# Patient Record
Sex: MALE | Race: WHITE | Employment: OTHER | URBAN - METROPOLITAN AREA
[De-identification: names, ages, dates, MRNs, and addresses within clinical notes are randomized per-mention and may not be internally consistent; named-entity substitution may affect disease eponyms.]

---

## 2017-08-02 ENCOUNTER — HOSPITAL ENCOUNTER (OUTPATIENT)
Dept: GENERAL RADIOLOGY | Age: 77
Discharge: HOME | End: 2017-08-02
Payer: MEDICARE

## 2017-08-02 ENCOUNTER — HOSPITAL ENCOUNTER (OUTPATIENT)
Age: 77
Discharge: HOME | End: 2017-08-02
Payer: MEDICARE

## 2017-08-02 DIAGNOSIS — M25.562 LEFT KNEE PAIN, UNSPECIFIED CHRONICITY: ICD-10-CM

## 2017-08-02 PROCEDURE — 73562 X-RAY EXAM OF KNEE 3: CPT

## 2018-03-05 ENCOUNTER — APPOINTMENT (OUTPATIENT)
Dept: CT IMAGING | Age: 78
End: 2018-03-05
Payer: MEDICARE

## 2018-03-05 ENCOUNTER — HOSPITAL ENCOUNTER (EMERGENCY)
Age: 78
Discharge: HOME | End: 2018-03-05
Attending: EMERGENCY MEDICINE
Payer: MEDICARE

## 2018-03-05 VITALS
OXYGEN SATURATION: 96 % | SYSTOLIC BLOOD PRESSURE: 166 MMHG | HEIGHT: 75 IN | TEMPERATURE: 98.6 F | BODY MASS INDEX: 29.84 KG/M2 | RESPIRATION RATE: 16 BRPM | HEART RATE: 70 BPM | WEIGHT: 240 LBS | DIASTOLIC BLOOD PRESSURE: 75 MMHG

## 2018-03-05 DIAGNOSIS — S09.90XA INJURY OF HEAD, INITIAL ENCOUNTER: ICD-10-CM

## 2018-03-05 DIAGNOSIS — S01.01XA LACERATION OF SCALP, INITIAL ENCOUNTER: Primary | ICD-10-CM

## 2018-03-05 PROCEDURE — 6370000000 HC RX 637 (ALT 250 FOR IP): Performed by: EMERGENCY MEDICINE

## 2018-03-05 PROCEDURE — 99283 EMERGENCY DEPT VISIT LOW MDM: CPT

## 2018-03-05 PROCEDURE — 70450 CT HEAD/BRAIN W/O DYE: CPT

## 2018-03-05 PROCEDURE — 12001 RPR S/N/AX/GEN/TRNK 2.5CM/<: CPT

## 2018-03-05 RX ORDER — DOXYCYCLINE HYCLATE 100 MG/1
100 CAPSULE ORAL 2 TIMES DAILY
COMMUNITY

## 2018-03-05 RX ORDER — CELECOXIB 200 MG/1
200 CAPSULE ORAL 2 TIMES DAILY
COMMUNITY

## 2018-03-05 RX ORDER — TRAMADOL HYDROCHLORIDE 50 MG/1
50 TABLET ORAL EVERY 8 HOURS PRN
Qty: 20 TABLET | Refills: 0 | Status: SHIPPED | OUTPATIENT
Start: 2018-03-05 | End: 2018-03-15

## 2018-03-05 RX ORDER — GINSENG 100 MG
CAPSULE ORAL 3 TIMES DAILY
Status: DISCONTINUED | OUTPATIENT
Start: 2018-03-05 | End: 2018-03-05 | Stop reason: HOSPADM

## 2018-03-05 RX ORDER — DULOXETIN HYDROCHLORIDE 30 MG/1
30 CAPSULE, DELAYED RELEASE ORAL 2 TIMES DAILY
COMMUNITY

## 2018-03-05 RX ORDER — CHOLECALCIFEROL (VITAMIN D3) 125 MCG
500 CAPSULE ORAL DAILY
COMMUNITY

## 2018-03-05 RX ORDER — ASPIRIN 81 MG/1
81 TABLET ORAL DAILY
COMMUNITY

## 2018-03-05 RX ORDER — ESOMEPRAZOLE MAGNESIUM 20 MG/1
20 FOR SUSPENSION ORAL DAILY
COMMUNITY

## 2018-03-05 RX ADMIN — BACITRACIN: 500 OINTMENT TOPICAL at 18:36

## 2018-03-05 ASSESSMENT — ENCOUNTER SYMPTOMS
EYE REDNESS: 0
BACK PAIN: 0
SORE THROAT: 0
FACIAL SWELLING: 0
TROUBLE SWALLOWING: 0
CHOKING: 0
CONSTIPATION: 0
VOMITING: 0
DIARRHEA: 0
SINUS PRESSURE: 0
CHEST TIGHTNESS: 0
VOICE CHANGE: 0
WHEEZING: 0
EYE DISCHARGE: 0
BLOOD IN STOOL: 0
EYE PAIN: 0
STRIDOR: 0
SHORTNESS OF BREATH: 0
ABDOMINAL PAIN: 0
COUGH: 0

## 2018-03-05 ASSESSMENT — PAIN DESCRIPTION - PAIN TYPE: TYPE: ACUTE PAIN

## 2018-03-05 ASSESSMENT — PAIN DESCRIPTION - PROGRESSION: CLINICAL_PROGRESSION: GRADUALLY WORSENING

## 2018-03-05 ASSESSMENT — PAIN DESCRIPTION - DESCRIPTORS: DESCRIPTORS: THROBBING

## 2018-03-05 ASSESSMENT — PAIN DESCRIPTION - FREQUENCY: FREQUENCY: CONTINUOUS

## 2018-03-05 ASSESSMENT — PAIN DESCRIPTION - ORIENTATION: ORIENTATION: UPPER

## 2018-03-05 ASSESSMENT — PAIN DESCRIPTION - ONSET: ONSET: SUDDEN

## 2018-03-05 ASSESSMENT — PAIN DESCRIPTION - LOCATION: LOCATION: HEAD
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
## 2019-03-12 ENCOUNTER — ANESTHESIA (OUTPATIENT)
Dept: OPERATING ROOM | Age: 79
End: 2019-03-12
Payer: MEDICARE

## 2019-03-12 ENCOUNTER — ANESTHESIA EVENT (OUTPATIENT)
Dept: OPERATING ROOM | Age: 79
End: 2019-03-12
Payer: MEDICARE

## 2019-03-12 ENCOUNTER — HOSPITAL ENCOUNTER (OUTPATIENT)
Age: 79
Setting detail: OUTPATIENT SURGERY
Discharge: HOME | End: 2019-03-12
Attending: ORTHOPAEDIC SURGERY | Admitting: ORTHOPAEDIC SURGERY
Payer: MEDICARE

## 2019-03-12 VITALS
TEMPERATURE: 98.3 F | HEIGHT: 76 IN | RESPIRATION RATE: 18 BRPM | WEIGHT: 250 LBS | BODY MASS INDEX: 30.44 KG/M2 | OXYGEN SATURATION: 99 % | DIASTOLIC BLOOD PRESSURE: 72 MMHG | SYSTOLIC BLOOD PRESSURE: 134 MMHG | HEART RATE: 98 BPM

## 2019-03-12 VITALS — OXYGEN SATURATION: 99 % | TEMPERATURE: 98.2 F | DIASTOLIC BLOOD PRESSURE: 81 MMHG | SYSTOLIC BLOOD PRESSURE: 148 MMHG

## 2019-03-12 DIAGNOSIS — Z96.652 TOTAL KNEE REPLACEMENT STATUS, LEFT: Primary | ICD-10-CM

## 2019-03-12 LAB
ANION GAP SERPL CALCULATED.3IONS-SCNC: 12 MEQ/L (ref 9–15)
BASOPHILS ABSOLUTE: 0.1 K/UL (ref 0–0.2)
BASOPHILS RELATIVE PERCENT: 1.5 %
BUN BLDV-MCNC: 23 MG/DL (ref 8–23)
CALCIUM SERPL-MCNC: 9.3 MG/DL (ref 8.5–9.9)
CHLORIDE BLD-SCNC: 104 MEQ/L (ref 95–107)
CO2: 19 MEQ/L (ref 20–31)
CREAT SERPL-MCNC: 0.65 MG/DL (ref 0.7–1.2)
EOSINOPHILS ABSOLUTE: 0.2 K/UL (ref 0–0.7)
EOSINOPHILS RELATIVE PERCENT: 2.2 %
GFR AFRICAN AMERICAN: >60
GFR NON-AFRICAN AMERICAN: >60
GLUCOSE BLD-MCNC: 101 MG/DL (ref 70–99)
HCT VFR BLD CALC: 35 % (ref 42–52)
HEMOGLOBIN: 11.1 G/DL (ref 14–18)
LYMPHOCYTES ABSOLUTE: 1.2 K/UL (ref 1–4.8)
LYMPHOCYTES RELATIVE PERCENT: 13.9 %
MCH RBC QN AUTO: 27.9 PG (ref 27–31.3)
MCHC RBC AUTO-ENTMCNC: 31.8 % (ref 33–37)
MCV RBC AUTO: 87.6 FL (ref 80–100)
MONOCYTES ABSOLUTE: 0.5 K/UL (ref 0.2–0.8)
MONOCYTES RELATIVE PERCENT: 6.2 %
NEUTROPHILS ABSOLUTE: 6.4 K/UL (ref 1.4–6.5)
NEUTROPHILS RELATIVE PERCENT: 76.2 %
PDW BLD-RTO: 19.2 % (ref 11.5–14.5)
PLATELET # BLD: 417 K/UL (ref 130–400)
POTASSIUM SERPL-SCNC: 5.8 MEQ/L (ref 3.4–4.9)
RBC # BLD: 3.99 M/UL (ref 4.7–6.1)
SODIUM BLD-SCNC: 135 MEQ/L (ref 135–144)
WBC # BLD: 8.4 K/UL (ref 4.8–10.8)

## 2019-03-12 PROCEDURE — 6360000002 HC RX W HCPCS: Performed by: ORTHOPAEDIC SURGERY

## 2019-03-12 PROCEDURE — 6370000000 HC RX 637 (ALT 250 FOR IP): Performed by: ORTHOPAEDIC SURGERY

## 2019-03-12 PROCEDURE — 7100000001 HC PACU RECOVERY - ADDTL 15 MIN: Performed by: ORTHOPAEDIC SURGERY

## 2019-03-12 PROCEDURE — 80048 BASIC METABOLIC PNL TOTAL CA: CPT

## 2019-03-12 PROCEDURE — 2500000003 HC RX 250 WO HCPCS: Performed by: NURSE ANESTHETIST, CERTIFIED REGISTERED

## 2019-03-12 PROCEDURE — 3600000003 HC SURGERY LEVEL 3 BASE: Performed by: ORTHOPAEDIC SURGERY

## 2019-03-12 PROCEDURE — 87075 CULTR BACTERIA EXCEPT BLOOD: CPT

## 2019-03-12 PROCEDURE — 7100000011 HC PHASE II RECOVERY - ADDTL 15 MIN: Performed by: ORTHOPAEDIC SURGERY

## 2019-03-12 PROCEDURE — 7100000000 HC PACU RECOVERY - FIRST 15 MIN: Performed by: ORTHOPAEDIC SURGERY

## 2019-03-12 PROCEDURE — 3600000013 HC SURGERY LEVEL 3 ADDTL 15MIN: Performed by: ORTHOPAEDIC SURGERY

## 2019-03-12 PROCEDURE — 2580000003 HC RX 258: Performed by: ORTHOPAEDIC SURGERY

## 2019-03-12 PROCEDURE — 3700000001 HC ADD 15 MINUTES (ANESTHESIA): Performed by: ORTHOPAEDIC SURGERY

## 2019-03-12 PROCEDURE — 6360000002 HC RX W HCPCS: Performed by: STUDENT IN AN ORGANIZED HEALTH CARE EDUCATION/TRAINING PROGRAM

## 2019-03-12 PROCEDURE — 2709999900 HC NON-CHARGEABLE SUPPLY: Performed by: ORTHOPAEDIC SURGERY

## 2019-03-12 PROCEDURE — 7100000010 HC PHASE II RECOVERY - FIRST 15 MIN: Performed by: ORTHOPAEDIC SURGERY

## 2019-03-12 PROCEDURE — 87070 CULTURE OTHR SPECIMN AEROBIC: CPT

## 2019-03-12 PROCEDURE — 6360000002 HC RX W HCPCS: Performed by: NURSE ANESTHETIST, CERTIFIED REGISTERED

## 2019-03-12 PROCEDURE — 3700000000 HC ANESTHESIA ATTENDED CARE: Performed by: ORTHOPAEDIC SURGERY

## 2019-03-12 PROCEDURE — 2500000003 HC RX 250 WO HCPCS: Performed by: ORTHOPAEDIC SURGERY

## 2019-03-12 PROCEDURE — 85025 COMPLETE CBC W/AUTO DIFF WBC: CPT

## 2019-03-12 PROCEDURE — 87205 SMEAR GRAM STAIN: CPT

## 2019-03-12 RX ORDER — OXYCODONE HYDROCHLORIDE AND ACETAMINOPHEN 5; 325 MG/1; MG/1
1 TABLET ORAL EVERY 6 HOURS PRN
Status: DISCONTINUED | OUTPATIENT
Start: 2019-03-12 | End: 2019-03-12 | Stop reason: HOSPADM

## 2019-03-12 RX ORDER — ESMOLOL HYDROCHLORIDE 10 MG/ML
INJECTION INTRAVENOUS PRN
Status: DISCONTINUED | OUTPATIENT
Start: 2019-03-12 | End: 2019-03-12 | Stop reason: SDUPTHER

## 2019-03-12 RX ORDER — OXYCODONE HYDROCHLORIDE AND ACETAMINOPHEN 5; 325 MG/1; MG/1
1 TABLET ORAL EVERY 4 HOURS PRN
Status: DISCONTINUED | OUTPATIENT
Start: 2019-03-12 | End: 2019-03-12 | Stop reason: HOSPADM

## 2019-03-12 RX ORDER — OXYCODONE HYDROCHLORIDE AND ACETAMINOPHEN 5; 325 MG/1; MG/1
1 TABLET ORAL EVERY 6 HOURS PRN
Qty: 20 TABLET | Refills: 0 | Status: SHIPPED | OUTPATIENT
Start: 2019-03-12 | End: 2019-03-17

## 2019-03-12 RX ORDER — FENTANYL CITRATE 50 UG/ML
50 INJECTION, SOLUTION INTRAMUSCULAR; INTRAVENOUS EVERY 10 MIN PRN
Status: COMPLETED | OUTPATIENT
Start: 2019-03-12 | End: 2019-03-12

## 2019-03-12 RX ORDER — MORPHINE SULFATE 4 MG/ML
4 INJECTION, SOLUTION INTRAMUSCULAR; INTRAVENOUS
Status: DISCONTINUED | OUTPATIENT
Start: 2019-03-12 | End: 2019-03-12 | Stop reason: HOSPADM

## 2019-03-12 RX ORDER — ONDANSETRON 2 MG/ML
4 INJECTION INTRAMUSCULAR; INTRAVENOUS EVERY 6 HOURS PRN
Status: DISCONTINUED | OUTPATIENT
Start: 2019-03-12 | End: 2019-03-12 | Stop reason: HOSPADM

## 2019-03-12 RX ORDER — MORPHINE SULFATE 2 MG/ML
2 INJECTION, SOLUTION INTRAMUSCULAR; INTRAVENOUS
Status: DISCONTINUED | OUTPATIENT
Start: 2019-03-12 | End: 2019-03-12 | Stop reason: HOSPADM

## 2019-03-12 RX ORDER — SODIUM CHLORIDE 0.9 % (FLUSH) 0.9 %
10 SYRINGE (ML) INJECTION PRN
Status: DISCONTINUED | OUTPATIENT
Start: 2019-03-12 | End: 2019-03-12 | Stop reason: HOSPADM

## 2019-03-12 RX ORDER — DOCUSATE SODIUM 100 MG/1
100 CAPSULE, LIQUID FILLED ORAL 2 TIMES DAILY
Status: DISCONTINUED | OUTPATIENT
Start: 2019-03-12 | End: 2019-03-12 | Stop reason: HOSPADM

## 2019-03-12 RX ORDER — OXYCODONE HYDROCHLORIDE AND ACETAMINOPHEN 5; 325 MG/1; MG/1
2 TABLET ORAL EVERY 4 HOURS PRN
Status: DISCONTINUED | OUTPATIENT
Start: 2019-03-12 | End: 2019-03-12 | Stop reason: HOSPADM

## 2019-03-12 RX ORDER — PROPOFOL 10 MG/ML
INJECTION, EMULSION INTRAVENOUS PRN
Status: DISCONTINUED | OUTPATIENT
Start: 2019-03-12 | End: 2019-03-12 | Stop reason: SDUPTHER

## 2019-03-12 RX ORDER — DIPHENHYDRAMINE HYDROCHLORIDE 50 MG/ML
12.5 INJECTION INTRAMUSCULAR; INTRAVENOUS
Status: DISCONTINUED | OUTPATIENT
Start: 2019-03-12 | End: 2019-03-12 | Stop reason: HOSPADM

## 2019-03-12 RX ORDER — SULFAMETHOXAZOLE AND TRIMETHOPRIM 400; 80 MG/1; MG/1
1 TABLET ORAL 2 TIMES DAILY
COMMUNITY

## 2019-03-12 RX ORDER — SODIUM CHLORIDE 0.9 % (FLUSH) 0.9 %
10 SYRINGE (ML) INJECTION EVERY 12 HOURS SCHEDULED
Status: DISCONTINUED | OUTPATIENT
Start: 2019-03-12 | End: 2019-03-12 | Stop reason: HOSPADM

## 2019-03-12 RX ORDER — MEPERIDINE HYDROCHLORIDE 25 MG/ML
12.5 INJECTION INTRAMUSCULAR; INTRAVENOUS; SUBCUTANEOUS EVERY 5 MIN PRN
Status: DISCONTINUED | OUTPATIENT
Start: 2019-03-12 | End: 2019-03-12 | Stop reason: HOSPADM

## 2019-03-12 RX ORDER — FENTANYL CITRATE 50 UG/ML
INJECTION, SOLUTION INTRAMUSCULAR; INTRAVENOUS PRN
Status: DISCONTINUED | OUTPATIENT
Start: 2019-03-12 | End: 2019-03-12 | Stop reason: SDUPTHER

## 2019-03-12 RX ORDER — METOCLOPRAMIDE HYDROCHLORIDE 5 MG/ML
10 INJECTION INTRAMUSCULAR; INTRAVENOUS
Status: DISCONTINUED | OUTPATIENT
Start: 2019-03-12 | End: 2019-03-12 | Stop reason: HOSPADM

## 2019-03-12 RX ORDER — MAGNESIUM HYDROXIDE 1200 MG/15ML
LIQUID ORAL CONTINUOUS PRN
Status: COMPLETED | OUTPATIENT
Start: 2019-03-12 | End: 2019-03-12

## 2019-03-12 RX ORDER — ONDANSETRON 2 MG/ML
4 INJECTION INTRAMUSCULAR; INTRAVENOUS
Status: COMPLETED | OUTPATIENT
Start: 2019-03-12 | End: 2019-03-12

## 2019-03-12 RX ORDER — WOUND DRESSING ADHESIVE - LIQUID
LIQUID MISCELLANEOUS PRN
Status: DISCONTINUED | OUTPATIENT
Start: 2019-03-12 | End: 2019-03-12 | Stop reason: ALTCHOICE

## 2019-03-12 RX ORDER — SODIUM CHLORIDE, SODIUM LACTATE, POTASSIUM CHLORIDE, CALCIUM CHLORIDE 600; 310; 30; 20 MG/100ML; MG/100ML; MG/100ML; MG/100ML
INJECTION, SOLUTION INTRAVENOUS CONTINUOUS
Status: DISCONTINUED | OUTPATIENT
Start: 2019-03-12 | End: 2019-03-12 | Stop reason: HOSPADM

## 2019-03-12 RX ORDER — CEFAZOLIN SODIUM 2 G/50ML
2 SOLUTION INTRAVENOUS
Status: COMPLETED | OUTPATIENT
Start: 2019-03-12 | End: 2019-03-12

## 2019-03-12 RX ADMIN — OXYCODONE HYDROCHLORIDE AND ACETAMINOPHEN 2 TABLET: 5; 325 TABLET ORAL at 16:10

## 2019-03-12 RX ADMIN — PROPOFOL 200 MG: 10 INJECTION, EMULSION INTRAVENOUS at 14:08

## 2019-03-12 RX ADMIN — ESMOLOL HYDROCHLORIDE 40 MG: 100 INJECTION, SOLUTION INTRAVENOUS at 14:13

## 2019-03-12 RX ADMIN — FENTANYL CITRATE 50 MCG: 50 INJECTION, SOLUTION INTRAMUSCULAR; INTRAVENOUS at 15:16

## 2019-03-12 RX ADMIN — FENTANYL CITRATE 50 MCG: 50 INJECTION, SOLUTION INTRAMUSCULAR; INTRAVENOUS at 15:40

## 2019-03-12 RX ADMIN — ESMOLOL HYDROCHLORIDE 10 MG: 100 INJECTION, SOLUTION INTRAVENOUS at 14:22

## 2019-03-12 RX ADMIN — ONDANSETRON 4 MG: 2 INJECTION INTRAMUSCULAR; INTRAVENOUS at 14:29

## 2019-03-12 RX ADMIN — FENTANYL CITRATE 50 MCG: 50 INJECTION, SOLUTION INTRAMUSCULAR; INTRAVENOUS at 14:15

## 2019-03-12 RX ADMIN — CEFAZOLIN SODIUM 2 G: 2 SOLUTION INTRAVENOUS at 14:11

## 2019-03-12 RX ADMIN — FENTANYL CITRATE 50 MCG: 50 INJECTION, SOLUTION INTRAMUSCULAR; INTRAVENOUS at 15:54

## 2019-03-12 RX ADMIN — SODIUM CHLORIDE, POTASSIUM CHLORIDE, SODIUM LACTATE AND CALCIUM CHLORIDE 1000 ML: 600; 310; 30; 20 INJECTION, SOLUTION INTRAVENOUS at 12:18

## 2019-03-12 RX ADMIN — FENTANYL CITRATE 50 MCG: 50 INJECTION, SOLUTION INTRAMUSCULAR; INTRAVENOUS at 15:27

## 2019-03-12 RX ADMIN — FENTANYL CITRATE 50 MCG: 50 INJECTION, SOLUTION INTRAMUSCULAR; INTRAVENOUS at 14:08

## 2019-03-12 RX ADMIN — ESMOLOL HYDROCHLORIDE 20 MG: 100 INJECTION, SOLUTION INTRAVENOUS at 14:15

## 2019-03-12 ASSESSMENT — PULMONARY FUNCTION TESTS
PIF_VALUE: 2
PIF_VALUE: 12
PIF_VALUE: 12
PIF_VALUE: 9
PIF_VALUE: 3
PIF_VALUE: 12
PIF_VALUE: 4
PIF_VALUE: 1
PIF_VALUE: 12
PIF_VALUE: 1
PIF_VALUE: 4
PIF_VALUE: 4
PIF_VALUE: 9
PIF_VALUE: 11
PIF_VALUE: 16
PIF_VALUE: 4
PIF_VALUE: 3
PIF_VALUE: 1
PIF_VALUE: 9
PIF_VALUE: 1
PIF_VALUE: 2
PIF_VALUE: 8
PIF_VALUE: 12
PIF_VALUE: 20
PIF_VALUE: 12
PIF_VALUE: 12
PIF_VALUE: 3
PIF_VALUE: 12
PIF_VALUE: 13
PIF_VALUE: 10
PIF_VALUE: 13
PIF_VALUE: 3
PIF_VALUE: 12
PIF_VALUE: 3
PIF_VALUE: 1
PIF_VALUE: 11
PIF_VALUE: 3
PIF_VALUE: 2
PIF_VALUE: 11
PIF_VALUE: 15
PIF_VALUE: 15
PIF_VALUE: 18
PIF_VALUE: 4
PIF_VALUE: 15
PIF_VALUE: 10
PIF_VALUE: 3
PIF_VALUE: 1
PIF_VALUE: 18
PIF_VALUE: 4
PIF_VALUE: 2
PIF_VALUE: 11

## 2019-03-12 ASSESSMENT — PAIN SCALES - GENERAL
PAINLEVEL_OUTOF10: 6
PAINLEVEL_OUTOF10: 8
PAINLEVEL_OUTOF10: 7
PAINLEVEL_OUTOF10: 8
PAINLEVEL_OUTOF10: 7

## 2019-03-16 LAB
ANAEROBIC CULTURE: NORMAL
CULTURE SURGICAL: NORMAL
GRAM STAIN RESULT: NORMAL

## 2024-09-18 ENCOUNTER — HOSPITAL ENCOUNTER (OUTPATIENT)
Dept: RADIOLOGY | Facility: CLINIC | Age: 84
Discharge: HOME | End: 2024-09-18
Payer: MEDICARE

## 2024-09-18 ENCOUNTER — OFFICE VISIT (OUTPATIENT)
Dept: ORTHOPEDIC SURGERY | Facility: CLINIC | Age: 84
End: 2024-09-18
Payer: MEDICARE

## 2024-09-18 VITALS — WEIGHT: 233 LBS | BODY MASS INDEX: 28.37 KG/M2 | HEIGHT: 76 IN

## 2024-09-18 DIAGNOSIS — M25.511 ACUTE PAIN OF RIGHT SHOULDER: ICD-10-CM

## 2024-09-18 DIAGNOSIS — M19.019 GLENOHUMERAL ARTHRITIS: Primary | ICD-10-CM

## 2024-09-18 PROCEDURE — 99214 OFFICE O/P EST MOD 30 MIN: CPT | Performed by: ORTHOPAEDIC SURGERY

## 2024-09-18 PROCEDURE — 1157F ADVNC CARE PLAN IN RCRD: CPT | Performed by: ORTHOPAEDIC SURGERY

## 2024-09-18 PROCEDURE — 1036F TOBACCO NON-USER: CPT | Performed by: ORTHOPAEDIC SURGERY

## 2024-09-18 PROCEDURE — 1159F MED LIST DOCD IN RCRD: CPT | Performed by: ORTHOPAEDIC SURGERY

## 2024-09-18 PROCEDURE — 73030 X-RAY EXAM OF SHOULDER: CPT | Mod: RT

## 2024-09-18 PROCEDURE — 73030 X-RAY EXAM OF SHOULDER: CPT | Mod: RIGHT SIDE | Performed by: ORTHOPAEDIC SURGERY

## 2024-09-18 RX ORDER — IPRATROPIUM BROMIDE 42 UG/1
SPRAY, METERED NASAL
COMMUNITY
Start: 2024-07-26

## 2024-09-18 RX ORDER — NEOMYCIN SULFATE, POLYMYXIN B SULFATE AND HYDROCORTISONE 10; 3.5; 1 MG/ML; MG/ML; [USP'U]/ML
SUSPENSION/ DROPS AURICULAR (OTIC)
COMMUNITY
Start: 2024-05-02

## 2024-09-18 RX ORDER — ESOMEPRAZOLE MAGNESIUM 40 MG/1
CAPSULE, DELAYED RELEASE ORAL
COMMUNITY
Start: 2023-12-04

## 2024-09-18 RX ORDER — ATORVASTATIN CALCIUM 40 MG/1
TABLET, FILM COATED ORAL
COMMUNITY

## 2024-09-18 RX ORDER — ASPIRIN 81 MG/1
TABLET ORAL
COMMUNITY

## 2024-09-18 RX ORDER — CARVEDILOL 25 MG/1
12.5 TABLET ORAL
COMMUNITY
Start: 2024-01-02

## 2024-09-18 RX ORDER — ROSUVASTATIN CALCIUM 5 MG/1
5 TABLET, COATED ORAL
COMMUNITY
Start: 2024-08-16

## 2024-09-18 RX ORDER — VALACYCLOVIR HYDROCHLORIDE 1 G/1
1000 TABLET, FILM COATED ORAL
COMMUNITY
Start: 2024-07-26

## 2024-09-18 RX ORDER — SULFAMETHOXAZOLE AND TRIMETHOPRIM 400; 80 MG/1; MG/1
1 TABLET ORAL 2 TIMES DAILY
COMMUNITY

## 2024-09-18 RX ORDER — CELECOXIB 200 MG/1
200 CAPSULE ORAL
COMMUNITY
Start: 2024-04-19

## 2024-09-18 RX ORDER — DOXYCYCLINE HYCLATE 100 MG
100 TABLET ORAL
COMMUNITY
Start: 2024-07-26

## 2024-09-18 RX ORDER — DIPHENHYDRAMINE HCL 25 MG
CAPSULE ORAL
COMMUNITY

## 2024-09-18 RX ORDER — TESTOSTERONE CYPIONATE 200 MG/ML
100 INJECTION, SOLUTION INTRAMUSCULAR
COMMUNITY
Start: 2024-04-10 | End: 2024-09-25

## 2024-09-18 RX ORDER — DULOXETIN HYDROCHLORIDE 30 MG/1
30 CAPSULE, DELAYED RELEASE ORAL
COMMUNITY

## 2024-09-18 RX ORDER — SUCRALFATE 1 G/1
TABLET ORAL
COMMUNITY
Start: 2022-12-07

## 2024-09-18 RX ORDER — UBIDECARENONE 75 MG
500 CAPSULE ORAL DAILY
COMMUNITY

## 2024-09-18 RX ORDER — POLYETHYLENE GLYCOL 3350 17 G/17G
17 POWDER, FOR SOLUTION ORAL
COMMUNITY
Start: 2024-08-27

## 2024-09-18 ASSESSMENT — ENCOUNTER SYMPTOMS
LOSS OF SENSATION IN FEET: 0
DEPRESSION: 0
OCCASIONAL FEELINGS OF UNSTEADINESS: 0

## 2024-09-18 NOTE — PROGRESS NOTES
History of present illness: Patient with a history of left shoulder replacement he has severe right shoulder arthritis he had multiple injections through the [facility] he had multiple spine surgeries he actually has done well for years but now his right shoulder is grinding popping catching with mechanical symptoms he comes in today frustrated and looking for treatment of his right shoulder    Physical exam:    General: No acute distress or breathing difficulty or discomfort, pleasant and cooperative with the examination.    Extremities: The right shoulder was inspected and was found to have no obvious deformity.  There was tenderness to touch over the lateral edges of the shoulder over the rotator cuff insertion.  Active forward flexion 110 degrees, external rotation to 50 degrees, abduction to 45 degrees, and internal rotation to the level of L2.    At this time the shoulder is neurovascular tact and neurosensory intact.  Motor intact C5-T1.  There was no obvious neck pain or radiculopathy noted.  There was no gross instability or adhesive capsulitis symptoms.  There was no evidence of apprehension or apprehension suppression.    Strength was tested in 4 planes with weakness in the supraspinatus strength testing and external rotation position.  There was no strength deficit in internal rotation.  Impingement signs were positive both supine and standing for impingement test type I and II.  There was mild pain over the bicipital groove with a positive speeds sign    Diagnostic studies: X-rays show advanced arthritic change right glenohumeral joint right shoulder    Impression: Right shoulder advanced arthritis    Plan: Options are fairly limited we talked about injections he has had those he like to proceed with shoulder replacement    He uniquely understands the risks and benefits and issues he has been through a left shoulder replacement    Risk and benefits of surgery discussed extensively with the  patient.    Surgical risk included but were not limited to infection, wear, loosening, need for further surgery blood clot, failure to heal, failure of the surgery, stiffness, loss of limb life, extremity function change in length change, and associated risks of  any surgery.    Will see him on the operative schedule at the hospital of his choice

## 2024-09-19 PROBLEM — M19.011 DEGENERATIVE JOINT DISEASE OF SHOULDER, RIGHT: Status: ACTIVE | Noted: 2024-11-08

## 2024-10-23 ENCOUNTER — OFFICE VISIT (OUTPATIENT)
Dept: ORTHOPEDIC SURGERY | Facility: CLINIC | Age: 84
End: 2024-10-23
Payer: MEDICARE

## 2024-10-23 DIAGNOSIS — M75.52 SCAPULOTHORACIC BURSITIS OF LEFT SHOULDER: Primary | ICD-10-CM

## 2024-10-23 PROCEDURE — 20610 DRAIN/INJ JOINT/BURSA W/O US: CPT | Performed by: ORTHOPAEDIC SURGERY

## 2024-10-23 PROCEDURE — 99213 OFFICE O/P EST LOW 20 MIN: CPT | Performed by: ORTHOPAEDIC SURGERY

## 2024-10-23 PROCEDURE — 1157F ADVNC CARE PLAN IN RCRD: CPT | Performed by: ORTHOPAEDIC SURGERY

## 2024-10-23 RX ORDER — BETAMETHASONE SODIUM PHOSPHATE AND BETAMETHASONE ACETATE 3; 3 MG/ML; MG/ML
2 INJECTION, SUSPENSION INTRA-ARTICULAR; INTRALESIONAL; INTRAMUSCULAR; SOFT TISSUE
Status: COMPLETED | OUTPATIENT
Start: 2024-10-23 | End: 2024-10-23

## 2024-10-23 RX ORDER — LIDOCAINE HYDROCHLORIDE 10 MG/ML
5 INJECTION, SOLUTION INFILTRATION; PERINEURAL
Status: COMPLETED | OUTPATIENT
Start: 2024-10-23 | End: 2024-10-23

## 2024-10-23 NOTE — PROGRESS NOTES
History of present illness: Patient with a history of left anatomic total shoulder done possibly 15 to 20 years ago he occasionally gets some scapular bursitis and on a limited basis we have done some injections into the scapulothoracic bursa with some good relief of pain    Physical exam:    General: No acute distress or breathing difficulty or discomfort, pleasant and cooperative with the examination.    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    Postprocedure discomfort can be alleviated with additional medications, ice, elevation, rest over the first 24 hours as recommended.    Patient verbalized understanding and wanted to proceed with the planned procedure.    After informed consent was provided and allergies verified, the patient was positioned appropriately on thel bed.  The joint to be aspirated and injected was prepped and draped in a sterile fashion.  The skin was anesthetized with ethyl chloride spray.  A joint aspiration was to be performed an 18-gauge needle was used otherwise a 22-gauge needle was used to inject the appropriate joint.    Joint injection was performed with a mixture of 5 cc 1% lidocaine plain and 2 cc Celestone  Soluspan 6 mg per mL.  The needle was removed and the puncture site closed and sealed with a Band-Aid.  The patient tolerated the procedure well.      Patient will follow-up in the next few weeks for his history and physical visit after his carotid ultrasound he is being worked up for right shoulder replacement.  L Inj/Asp: L subacromial bursa on 10/23/2024 3:27 PM  Indications: pain  Details: 22 G needle, medial approach  Medications: 2 mL betamethasone acet,sod phos 6 mg/mL; 5 mL lidocaine 10 mg/mL (1 %)  Outcome: tolerated well, no immediate complications  Procedure, treatment alternatives, risks and benefits explained, specific risks discussed. Consent was given by the patient. Immediately prior to procedure a time out was called to verify the correct patient, procedure, equipment, support staff and site/side marked as required.

## 2024-10-25 ENCOUNTER — PRE-ADMISSION TESTING (OUTPATIENT)
Dept: PREADMISSION TESTING | Facility: HOSPITAL | Age: 84
End: 2024-10-25
Payer: MEDICARE

## 2024-10-25 ENCOUNTER — HOSPITAL ENCOUNTER (OUTPATIENT)
Dept: CARDIOLOGY | Facility: HOSPITAL | Age: 84
Discharge: HOME | End: 2024-10-25
Payer: MEDICARE

## 2024-10-25 VITALS
WEIGHT: 237 LBS | SYSTOLIC BLOOD PRESSURE: 136 MMHG | HEART RATE: 66 BPM | RESPIRATION RATE: 18 BRPM | HEIGHT: 76 IN | DIASTOLIC BLOOD PRESSURE: 69 MMHG | BODY MASS INDEX: 28.86 KG/M2 | OXYGEN SATURATION: 98 %

## 2024-10-25 DIAGNOSIS — Z01.818 PRE-OP TESTING: ICD-10-CM

## 2024-10-25 LAB
BASOPHILS # BLD AUTO: 0.03 X10*3/UL (ref 0–0.1)
BASOPHILS NFR BLD AUTO: 0.3 %
EOSINOPHIL # BLD AUTO: 0 X10*3/UL (ref 0–0.4)
EOSINOPHIL NFR BLD AUTO: 0 %
ERYTHROCYTE [DISTWIDTH] IN BLOOD BY AUTOMATED COUNT: 15.5 % (ref 11.5–14.5)
HCT VFR BLD AUTO: 39.7 % (ref 41–52)
HGB BLD-MCNC: 13.5 G/DL (ref 13.5–17.5)
IMM GRANULOCYTES # BLD AUTO: 0.06 X10*3/UL (ref 0–0.5)
IMM GRANULOCYTES NFR BLD AUTO: 0.6 % (ref 0–0.9)
INR PPP: 1.1 (ref 0.9–1.1)
LYMPHOCYTES # BLD AUTO: 1.28 X10*3/UL (ref 0.8–3)
LYMPHOCYTES NFR BLD AUTO: 13.2 %
MCH RBC QN AUTO: 33.7 PG (ref 26–34)
MCHC RBC AUTO-ENTMCNC: 34 G/DL (ref 32–36)
MCV RBC AUTO: 99 FL (ref 80–100)
MONOCYTES # BLD AUTO: 0.78 X10*3/UL (ref 0.05–0.8)
MONOCYTES NFR BLD AUTO: 8 %
NEUTROPHILS # BLD AUTO: 7.58 X10*3/UL (ref 1.6–5.5)
NEUTROPHILS NFR BLD AUTO: 77.9 %
NRBC BLD-RTO: 0.4 /100 WBCS (ref 0–0)
PLATELET # BLD AUTO: 237 X10*3/UL (ref 150–450)
PROTHROMBIN TIME: 12.1 SECONDS (ref 9.8–12.8)
RBC # BLD AUTO: 4.01 X10*6/UL (ref 4.5–5.9)
WBC # BLD AUTO: 9.7 X10*3/UL (ref 4.4–11.3)

## 2024-10-25 PROCEDURE — 85025 COMPLETE CBC W/AUTO DIFF WBC: CPT

## 2024-10-25 PROCEDURE — 93010 ELECTROCARDIOGRAM REPORT: CPT | Performed by: INTERNAL MEDICINE

## 2024-10-25 PROCEDURE — 87081 CULTURE SCREEN ONLY: CPT | Mod: ELYLAB

## 2024-10-25 PROCEDURE — 36415 COLL VENOUS BLD VENIPUNCTURE: CPT

## 2024-10-25 PROCEDURE — 93005 ELECTROCARDIOGRAM TRACING: CPT

## 2024-10-25 PROCEDURE — 85610 PROTHROMBIN TIME: CPT

## 2024-10-25 RX ORDER — ASCORBIC ACID 250 MG
250 TABLET,CHEWABLE ORAL
COMMUNITY

## 2024-10-25 NOTE — PREPROCEDURE INSTRUCTIONS
JOINT REPLACEMENT AND SPINAL SURGERY PRE-OPERATIVE INSTRUCTIONS     You will receive notification one business day prior to your surgery to confirm your arrival time and any additional information between 2 P.M. - 5 P.M. It is important that you answer your phone and/or check your messages during this time.     You may see in [employer] your surgery start time change several times even up to the day before your procedure. Please disregard those times and only follow the time given by the  who will be notifying you via phone and not a text.     Please arrive at your scheduled time to avoid delay or cancelled surgery.     Please enter the building through either the Main Entrance in front of the hospital or from the Parking Garage Walk Way Bridge. From the parking garage, which is free, take the 2nd Floor Walkway Bridge into the hospital and check in at the [facility] Outpatient Desk as you enter the hospital directly in front of you. If you enter through the Main Entrance take the elevator off the lobby on the right labeled “A” to the 2nd floor and check in at the [facility] Outpatient Surgery desk as you exit the elevator. Wheelchairs are available for use if using the Main Entrance.     Handicap parking in the land lot, in front of hospital by main entrance, wheelchairs are available at this entrance     ?   INSTRUCTIONS:   Please contact your doctor’s office, who is doing procedure, about any changes in your health, bad cold, fever, sore throat, or COVID within last 4 weeks     Talk to your surgeon for instructions if you should stop your aspirin, blood thinner, diabetes medicines, weight loss medications, multivitamins or over the counter supplements since many surgeons have you adjust or stop these medications prior to procedure. The doctor’s office may have you contact the prescribing doctor for medication adjustments for your surgery.     If you take certain medications like Beta Blocker or  Anti-Seizure medication, you may have to take them the morning of procedure with a sip of water. If this is the case your surgeons office should let you know, and the PAT nurses will follow up when they speak to you to verify you are aware.     If not staying overnight after your surgery, and you are receiving any type of anesthesia with your surgery, you must have an adult (age 18 or older) immediately available to drive you home after surgery or your procedure will be cancelled. You may be discharged home after surgery per an Uber, Lyft, Taxi or any other transportation service as long as the responsible adult (18 or older) is in the vehicle with you at time of discharge. The  of these transportation services is not responsible for your care and cannot be consider a responsible adult. We also highly recommend you have someone stay with you for the entire day and night of your surgery.     All jewelry and piercings must be removed. If you are unable to remove an item or have a dermal piercing, please be sure to tell the nurse when you arrive for surgery.     Nail polish must be removed off one finger of each hand     Make-up or other beauty products (lotion, deodorant, hairspray, perfume, etc.) must be removed or not used for day of surgery.     Avoid smoking, consuming alcohol, or any medical or recreational drug use for 24 hours before surgery.     Do not wear contacts to hospital, bring your glasses and a case     Leave valuables at home except photo ID, insurance card and any co-payment that has been requested by hospital.     For adults who are unable to consent or make medical decisions for themselves, a legal guardian or Power of  must accompany them to the surgery center. If this is not possible, please call [phone] to make additional arrangements.  If there is any guardianship or legal Power of  paperwork, please bring them the day of surgery.     Wear comfortable, loose fitting  clothing into the procedure.  While your admitted you are asked to bring short sleeve shirts, shorts (loose like pajamas, sweat shorts), and tennis shoes/sneakers.  No slip on shoes.     Please bring your 2-Wheeled Walker with you the day of surgery and the [place] [facility] Booklet that was given to you during your PAT appointment.     The nurse practitioners, , , physical therapist, and occupational therapist will all discuss and work with you during your stay to help with discharge, physical therapy and any other needs. They also may discuss a program called Meds to Beds, where postoperative medications prescribed to you after surgery will be filled and delivered to your beside before you leave, so that you do not need to stop at the pharmacy on the way home    If you use a CPAP or BIPAP, please make sure the PAT nurse was able to get the settings from you, if not please inform the nurses the day of surgery of your settings you use at home.     Additional instructions about eating and drinking before surgery:     Do not eat any solid foods?or drink anything after midnight the night prior to your procedure. Milk, nutritional drinks/supplements, and infant formula are considered solid foods.  This also includes no gum, candy, lozenges, ice, or any other oral consumption.     CHG SOAP    In regards to bathing, please follow the instructions explained to you at the PAT appointment about taking a showering with the CHG soap the 5 nights prior to your surgery.       During your PAT Appointment you were given [employer] CHG Wash Soap (bottles of bubble gum pink soap) to use before procedure.  Begin using the CHG soap 5 days before your scheduled surgery.  Be sure to sleep on clean sheets - change your sheets the first night you do this cleansing process (you don't not need to change them every night).     5 days prior to surgery at night- 4 days prior to surgery  at night- 3 days prior to  surgery at night- the Night before Surgery   You will take a shower in the evening before bed.  You will wash and rinse your face, genitals, and hair with normal soap and normal shampoo.     Then apply CHG soap solution to wet washcloth.     Turn the water off or move away from the water spray to avoid premature rinsing of the CHG soap as you apply it.   Apply the CHG soap to entire body from the neck down EXCEPT your face and genitals.  Allow the CHG soap to sit for 3 minutes on your skin.  Then turn on water and rinse the CHG solution off your body completely.    DO NOT wash with regular soap after you have used the CHG soap   Pat yourself dry with a clean, fresh-laundered towel when you get out of the shower and clean Pj's  DO NOT apply any powders, deodorants, or lotions after shower   Be aware the CHG soap will stain fabrics if you wash them with bleach after use.   Make sure to pay special attention to cleaning area(s) where your incision(s) will be located.   You will repeat this same process steps 1-12: 5 days prior at night- 4 days prior at night- 3 days prior at night- the Night before Surgery     Morning of Surgery CHG Soap- (Process changes slightly)  1. You will take a shower in the evening before bed.  2. You will wash and rinse your face and genitals with normal soap.  3. Then apply CHG soap solution to wet washcloth.     4. Turn the water off or move away from the water spray to avoid premature rinsing of the CHG soap as you apply it.   5. Apply the CHG soap to entire body from the neck down EXCEPT your face and genitals.  6. Wash your hair with CHG soap.  7.Allow the CHG soap to sit for 3 minutes on your skin and in your hair.  Then turn on water and rinse the CHG solution off your body completely.    DO NOT wash with regular soap after you have used the CHG soap   Pat yourself dry with a clean, fresh-laundered towel when you get out of the shower and clean Pj's  DO NOT apply any powders,  deodorants, hair products or lotions after shower   Be aware the CHG soap will stain fabrics if you wash them with bleach after use.   Make sure to pay special attention to cleaning area(s) where your incision(s) will be located.   Put clean clothes on to come in for procedure.    ORAL RINSE   You will receive a call or notification from your pharmacy to  a prescription prior to procedure.  Be sure to  the prescription oral rinse from your local pharmacy.   You will be using the oral rinse the night before and the morning of surgery.    Take a cap full of the solution, 15mL     Swish (gargle if you can) the mouthwash in your mouth for at least 30 seconds, (DO NOT SWALLOW), and spit out     After the oral CHG rinse, do not rinse your mouth with water, drink, or eat.      If you have to take a medication the morning of surgery as instructed by your surgeon- please take that medication with a sip of water prior to doing the oral rinse the day of surgery.       ?If you have any questions or concerns, please call Pre-Admission Testing at [phone] or your Physician’s office Dr. Tim Salas  [phone]  [place] [facility] General Line: [phone]

## 2024-10-26 LAB — STAPHYLOCOCCUS SPEC CULT: NORMAL

## 2024-10-27 LAB
ATRIAL RATE: 70 BPM
P AXIS: 29 DEGREES
P OFFSET: 175 MS
P ONSET: 128 MS
PR INTERVAL: 184 MS
Q ONSET: 220 MS
QRS COUNT: 12 BEATS
QRS DURATION: 78 MS
QT INTERVAL: 418 MS
QTC CALCULATION(BAZETT): 451 MS
QTC FREDERICIA: 440 MS
R AXIS: 84 DEGREES
T AXIS: -17 DEGREES
T OFFSET: 429 MS
VENTRICULAR RATE: 70 BPM

## 2024-10-28 DIAGNOSIS — Z01.818 PRE-OPERATIVE CLEARANCE: Primary | ICD-10-CM

## 2024-10-28 RX ORDER — CHLORHEXIDINE GLUCONATE ORAL RINSE 1.2 MG/ML
15 SOLUTION DENTAL DAILY
Qty: 30 ML | Refills: 0 | Status: SHIPPED | OUTPATIENT
Start: 2024-10-28 | End: 2024-10-30

## 2024-11-06 ENCOUNTER — OFFICE VISIT (OUTPATIENT)
Dept: ORTHOPEDIC SURGERY | Facility: CLINIC | Age: 84
End: 2024-11-06
Payer: MEDICARE

## 2024-11-06 DIAGNOSIS — Z96.611 S/P REVERSE TOTAL SHOULDER ARTHROPLASTY, RIGHT: ICD-10-CM

## 2024-11-06 PROCEDURE — 99211 OFF/OP EST MAY X REQ PHY/QHP: CPT | Performed by: PHYSICIAN ASSISTANT

## 2024-11-06 PROCEDURE — 1157F ADVNC CARE PLAN IN RCRD: CPT | Performed by: PHYSICIAN ASSISTANT

## 2024-11-06 PROCEDURE — 99024 POSTOP FOLLOW-UP VISIT: CPT | Performed by: PHYSICIAN ASSISTANT

## 2024-11-06 PROCEDURE — L3670 SO ACRO/CLAV CAN WEB PRE OTS: HCPCS | Performed by: PHYSICIAN ASSISTANT
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
## 2024-11-07 ENCOUNTER — ANESTHESIA EVENT (OUTPATIENT)
Dept: OPERATING ROOM | Facility: HOSPITAL | Age: 84
End: 2024-11-07
Payer: MEDICARE

## 2024-11-07 PROCEDURE — RXMED WILLOW AMBULATORY MEDICATION CHARGE

## 2024-11-07 NOTE — H&P
History Of Present Illness  Fabian Romero is a 84 y.o. male presenting with right shoulder pain, weakness and loss of range of motion.  Patient with known right shoulder degenerative joint disease and rotator cuff arthropathy has treated this conservatively for years.  Conservative therapy is no longer providing relief.  After risk benefits alternatives were discussed patient likes to proceed with right reverse total shoulder arthroplasty.     Past Medical History  He has a past medical history of A-fib (Multi), Anxiety, Cataract, Cholelithiasis, Depression, Gastric ulcer, GERD (gastroesophageal reflux disease), Hard of hearing, Hearing aid worn, History of postoperative delirium, shoulder surgery, Hyperlipidemia, Hypertension, PTSD (post-traumatic stress disorder), Shortness of breath, and Tension headache.    Surgical History  He has a past surgical history that includes Aortic valve replacement; Cataract extraction; Cholecystectomy; Appendectomy; Tonsillectomy; Colonoscopy; Upper gastrointestinal endoscopy; Spine surgery; Total knee arthroplasty (Bilateral); Knee surgery (Bilateral); Carpal tunnel release (Bilateral); Wrist surgery (Right); and Other surgical history.     Social History  He reports that he has quit smoking. His smoking use included pipe. He has never used smokeless tobacco. He reports that he does not currently use alcohol. He reports that he does not use drugs.    Family History  No family history on file.     Allergies  Bee venom protein (honey bee), Gabapentin, Hydromorphone hcl, Oxycodone, Atorvastatin, Ace inhibitors, Amlodipine, Diclofenac, Etodolac, Talc, and Lidocaine    Review of Systems  Noncontributory     Physical Exam  Head normocephalic atraumatic  Chest lungs are clear to auscultation bilaterally  Cardiac regular rate rhythm no murmurs rubs gallops appreciated  Abdomen soft nontender active bowel sounds x 4 quadrants  Extremities right shoulder patient has forward flexion up to 90  degrees abduction of 50 degrees external rotation of 30 degrees internal rotation posterior iliac crest  Last Recorded Vitals  There were no vitals taken for this visit.    Relevant Results      Scheduled medications    Continuous medications    PRN medications    No results found for this or any previous visit (from the past 24 hours).    Assessment/Plan   Assessment & Plan  Degenerative joint disease of shoulder, right      Right reverse total shoulder arthroplasty           Tim Trevino PA-C

## 2024-11-08 ENCOUNTER — APPOINTMENT (OUTPATIENT)
Dept: RADIOLOGY | Facility: HOSPITAL | Age: 84
End: 2024-11-08
Payer: MEDICARE

## 2024-11-08 ENCOUNTER — ANESTHESIA (OUTPATIENT)
Dept: OPERATING ROOM | Facility: HOSPITAL | Age: 84
End: 2024-11-08
Payer: MEDICARE

## 2024-11-08 ENCOUNTER — HOSPITAL ENCOUNTER (OUTPATIENT)
Facility: HOSPITAL | Age: 84
Discharge: HOME | End: 2024-11-10
Attending: ORTHOPAEDIC SURGERY | Admitting: ORTHOPAEDIC SURGERY
Payer: MEDICARE

## 2024-11-08 VITALS
OXYGEN SATURATION: 93 % | HEART RATE: 76 BPM | SYSTOLIC BLOOD PRESSURE: 131 MMHG | DIASTOLIC BLOOD PRESSURE: 60 MMHG | TEMPERATURE: 95.9 F | RESPIRATION RATE: 16 BRPM

## 2024-11-08 DIAGNOSIS — M19.211 OTHER SECONDARY OSTEOARTHRITIS OF RIGHT SHOULDER: Primary | ICD-10-CM

## 2024-11-08 DIAGNOSIS — Z96.619 HISTORY OF REVERSE TOTAL REPLACEMENT OF SHOULDER JOINT: ICD-10-CM

## 2024-11-08 DIAGNOSIS — M12.811 ROTATOR CUFF ARTHROPATHY OF RIGHT SHOULDER: ICD-10-CM

## 2024-11-08 PROBLEM — I10 PRIMARY HYPERTENSION: Status: ACTIVE | Noted: 2024-11-08

## 2024-11-08 PROBLEM — I38 VALVULAR HEART DISEASE: Status: ACTIVE | Noted: 2024-11-08

## 2024-11-08 PROBLEM — E78.5 HYPERLIPIDEMIA: Status: ACTIVE | Noted: 2024-11-08

## 2024-11-08 PROCEDURE — 23472 RECONSTRUCT SHOULDER JOINT: CPT | Performed by: PHYSICIAN ASSISTANT

## 2024-11-08 PROCEDURE — 3700000001 HC GENERAL ANESTHESIA TIME - INITIAL BASE CHARGE: Performed by: ORTHOPAEDIC SURGERY

## 2024-11-08 PROCEDURE — 2500000005 HC RX 250 GENERAL PHARMACY W/O HCPCS: Performed by: ORTHOPAEDIC SURGERY

## 2024-11-08 PROCEDURE — 2500000001 HC RX 250 WO HCPCS SELF ADMINISTERED DRUGS (ALT 637 FOR MEDICARE OP): Performed by: NURSE PRACTITIONER

## 2024-11-08 PROCEDURE — 7100000011 HC EXTENDED STAY RECOVERY HOURLY - NURSING UNIT

## 2024-11-08 PROCEDURE — 7100000002 HC RECOVERY ROOM TIME - EACH INCREMENTAL 1 MINUTE: Performed by: ORTHOPAEDIC SURGERY

## 2024-11-08 PROCEDURE — 2500000004 HC RX 250 GENERAL PHARMACY W/ HCPCS (ALT 636 FOR OP/ED): Performed by: ORTHOPAEDIC SURGERY

## 2024-11-08 PROCEDURE — C1776 JOINT DEVICE (IMPLANTABLE): HCPCS | Performed by: ORTHOPAEDIC SURGERY

## 2024-11-08 PROCEDURE — 2500000002 HC RX 250 W HCPCS SELF ADMINISTERED DRUGS (ALT 637 FOR MEDICARE OP, ALT 636 FOR OP/ED): Performed by: ORTHOPAEDIC SURGERY

## 2024-11-08 PROCEDURE — 3600000005 HC OR TIME - INITIAL BASE CHARGE - PROCEDURE LEVEL FIVE: Performed by: ORTHOPAEDIC SURGERY

## 2024-11-08 PROCEDURE — 2500000001 HC RX 250 WO HCPCS SELF ADMINISTERED DRUGS (ALT 637 FOR MEDICARE OP): Performed by: ORTHOPAEDIC SURGERY

## 2024-11-08 PROCEDURE — 3600000010 HC OR TIME - EACH INCREMENTAL 1 MINUTE - PROCEDURE LEVEL FIVE: Performed by: ORTHOPAEDIC SURGERY

## 2024-11-08 PROCEDURE — 73020 X-RAY EXAM OF SHOULDER: CPT | Mod: RT

## 2024-11-08 PROCEDURE — 73020 X-RAY EXAM OF SHOULDER: CPT | Mod: RIGHT SIDE | Performed by: RADIOLOGY

## 2024-11-08 PROCEDURE — C1713 ANCHOR/SCREW BN/BN,TIS/BN: HCPCS | Performed by: ORTHOPAEDIC SURGERY

## 2024-11-08 PROCEDURE — 2500000004 HC RX 250 GENERAL PHARMACY W/ HCPCS (ALT 636 FOR OP/ED): Mod: JZ | Performed by: ORTHOPAEDIC SURGERY

## 2024-11-08 PROCEDURE — 2720000007 HC OR 272 NO HCPCS: Performed by: ORTHOPAEDIC SURGERY

## 2024-11-08 PROCEDURE — 7100000001 HC RECOVERY ROOM TIME - INITIAL BASE CHARGE: Performed by: ORTHOPAEDIC SURGERY

## 2024-11-08 PROCEDURE — 3700000002 HC GENERAL ANESTHESIA TIME - EACH INCREMENTAL 1 MINUTE: Performed by: ORTHOPAEDIC SURGERY

## 2024-11-08 PROCEDURE — 2500000004 HC RX 250 GENERAL PHARMACY W/ HCPCS (ALT 636 FOR OP/ED): Performed by: ANESTHESIOLOGY

## 2024-11-08 PROCEDURE — 2780000003 HC OR 278 NO HCPCS: Performed by: ORTHOPAEDIC SURGERY

## 2024-11-08 PROCEDURE — 23472 RECONSTRUCT SHOULDER JOINT: CPT | Performed by: ORTHOPAEDIC SURGERY

## 2024-11-08 PROCEDURE — 2500000004 HC RX 250 GENERAL PHARMACY W/ HCPCS (ALT 636 FOR OP/ED)

## 2024-11-08 DEVICE — IMPLANTABLE DEVICE: Type: IMPLANTABLE DEVICE | Site: SHOULDER | Status: FUNCTIONAL

## 2024-11-08 DEVICE — SCREW, NCB, CANCELLOUS, SELF TAP, 4.5 X 38MM: Type: IMPLANTABLE DEVICE | Site: SHOULDER | Status: FUNCTIONAL

## 2024-11-08 DEVICE — LOCKING CAP, NCB: Type: IMPLANTABLE DEVICE | Site: SHOULDER | Status: FUNCTIONAL

## 2024-11-08 DEVICE — BASE PLATE, MMTM RVS, 26MM, 15MM: Type: IMPLANTABLE DEVICE | Site: SHOULDER | Status: FUNCTIONAL

## 2024-11-08 DEVICE — PIN, TM REVERSE 2.5MM: Type: IMPLANTABLE DEVICE | Site: SHOULDER | Status: FUNCTIONAL

## 2024-11-08 RX ORDER — TRANEXAMIC ACID 650 MG/1
1950 TABLET ORAL ONCE
Status: COMPLETED | OUTPATIENT
Start: 2024-11-09 | End: 2024-11-09

## 2024-11-08 RX ORDER — BISACODYL 5 MG
10 TABLET, DELAYED RELEASE (ENTERIC COATED) ORAL DAILY PRN
Status: DISCONTINUED | OUTPATIENT
Start: 2024-11-08 | End: 2024-11-10 | Stop reason: HOSPADM

## 2024-11-08 RX ORDER — CYCLOBENZAPRINE HCL 10 MG
10 TABLET ORAL 3 TIMES DAILY PRN
Status: DISCONTINUED | OUTPATIENT
Start: 2024-11-08 | End: 2024-11-10 | Stop reason: HOSPADM

## 2024-11-08 RX ORDER — FENTANYL CITRATE 50 UG/ML
50 INJECTION, SOLUTION INTRAMUSCULAR; INTRAVENOUS EVERY 5 MIN PRN
Status: DISCONTINUED | OUTPATIENT
Start: 2024-11-08 | End: 2024-11-08 | Stop reason: HOSPADM

## 2024-11-08 RX ORDER — TRANEXAMIC ACID 650 MG/1
1950 TABLET ORAL ONCE
Status: COMPLETED | OUTPATIENT
Start: 2024-11-08 | End: 2024-11-08

## 2024-11-08 RX ORDER — NALOXONE HYDROCHLORIDE 1 MG/ML
0.2 INJECTION INTRAMUSCULAR; INTRAVENOUS; SUBCUTANEOUS EVERY 5 MIN PRN
Status: DISCONTINUED | OUTPATIENT
Start: 2024-11-08 | End: 2024-11-10 | Stop reason: HOSPADM

## 2024-11-08 RX ORDER — CEFAZOLIN SODIUM 2 G/100ML
2 INJECTION, SOLUTION INTRAVENOUS ONCE
Status: COMPLETED | OUTPATIENT
Start: 2024-11-08 | End: 2024-11-08

## 2024-11-08 RX ORDER — ONDANSETRON HYDROCHLORIDE 2 MG/ML
4 INJECTION, SOLUTION INTRAVENOUS EVERY 8 HOURS PRN
Status: DISCONTINUED | OUTPATIENT
Start: 2024-11-08 | End: 2024-11-10 | Stop reason: HOSPADM

## 2024-11-08 RX ORDER — PROCHLORPERAZINE 25 MG/1
25 SUPPOSITORY RECTAL EVERY 12 HOURS PRN
Status: DISCONTINUED | OUTPATIENT
Start: 2024-11-08 | End: 2024-11-10 | Stop reason: HOSPADM

## 2024-11-08 RX ORDER — MIDAZOLAM HYDROCHLORIDE 5 MG/ML
INJECTION INTRAMUSCULAR; INTRAVENOUS AS NEEDED
Status: DISCONTINUED | OUTPATIENT
Start: 2024-11-08 | End: 2024-11-08

## 2024-11-08 RX ORDER — PROPOFOL 10 MG/ML
INJECTION, EMULSION INTRAVENOUS AS NEEDED
Status: DISCONTINUED | OUTPATIENT
Start: 2024-11-08 | End: 2024-11-08

## 2024-11-08 RX ORDER — DOCUSATE SODIUM 100 MG/1
100 CAPSULE, LIQUID FILLED ORAL 2 TIMES DAILY
Status: DISCONTINUED | OUTPATIENT
Start: 2024-11-08 | End: 2024-11-10 | Stop reason: HOSPADM

## 2024-11-08 RX ORDER — OXYCODONE HYDROCHLORIDE 5 MG/1
5 TABLET ORAL EVERY 4 HOURS PRN
Status: DISCONTINUED | OUTPATIENT
Start: 2024-11-08 | End: 2024-11-08 | Stop reason: SDUPTHER

## 2024-11-08 RX ORDER — ONDANSETRON HYDROCHLORIDE 2 MG/ML
INJECTION, SOLUTION INTRAVENOUS AS NEEDED
Status: DISCONTINUED | OUTPATIENT
Start: 2024-11-08 | End: 2024-11-08

## 2024-11-08 RX ORDER — CEFAZOLIN SODIUM 2 G/100ML
2 INJECTION, SOLUTION INTRAVENOUS EVERY 8 HOURS
Status: COMPLETED | OUTPATIENT
Start: 2024-11-08 | End: 2024-11-09

## 2024-11-08 RX ORDER — WATER 1 ML/ML
IRRIGANT IRRIGATION AS NEEDED
Status: DISCONTINUED | OUTPATIENT
Start: 2024-11-08 | End: 2024-11-08 | Stop reason: HOSPADM

## 2024-11-08 RX ORDER — OXYCODONE HYDROCHLORIDE 5 MG/1
10 TABLET ORAL EVERY 6 HOURS PRN
Status: DISCONTINUED | OUTPATIENT
Start: 2024-11-08 | End: 2024-11-10 | Stop reason: HOSPADM

## 2024-11-08 RX ORDER — ACETAMINOPHEN 500 MG
5 TABLET ORAL NIGHTLY PRN
Status: DISCONTINUED | OUTPATIENT
Start: 2024-11-08 | End: 2024-11-10 | Stop reason: HOSPADM

## 2024-11-08 RX ORDER — MEPERIDINE HYDROCHLORIDE 25 MG/ML
12.5 INJECTION INTRAMUSCULAR; INTRAVENOUS; SUBCUTANEOUS EVERY 10 MIN PRN
Status: DISCONTINUED | OUTPATIENT
Start: 2024-11-08 | End: 2024-11-08 | Stop reason: HOSPADM

## 2024-11-08 RX ORDER — FENTANYL CITRATE 50 UG/ML
INJECTION, SOLUTION INTRAMUSCULAR; INTRAVENOUS AS NEEDED
Status: DISCONTINUED | OUTPATIENT
Start: 2024-11-08 | End: 2024-11-08

## 2024-11-08 RX ORDER — PROCHLORPERAZINE EDISYLATE 5 MG/ML
10 INJECTION INTRAMUSCULAR; INTRAVENOUS EVERY 6 HOURS PRN
Status: DISCONTINUED | OUTPATIENT
Start: 2024-11-08 | End: 2024-11-10 | Stop reason: HOSPADM

## 2024-11-08 RX ORDER — ACETAMINOPHEN 325 MG/1
975 TABLET ORAL ONCE
Status: COMPLETED | OUTPATIENT
Start: 2024-11-08 | End: 2024-11-08

## 2024-11-08 RX ORDER — ACETAMINOPHEN 325 MG/1
650 TABLET ORAL EVERY 6 HOURS SCHEDULED
Status: DISCONTINUED | OUTPATIENT
Start: 2024-11-08 | End: 2024-11-10 | Stop reason: HOSPADM

## 2024-11-08 RX ORDER — SODIUM CHLORIDE, SODIUM LACTATE, POTASSIUM CHLORIDE, CALCIUM CHLORIDE 600; 310; 30; 20 MG/100ML; MG/100ML; MG/100ML; MG/100ML
100 INJECTION, SOLUTION INTRAVENOUS CONTINUOUS
Status: DISCONTINUED | OUTPATIENT
Start: 2024-11-08 | End: 2024-11-08 | Stop reason: HOSPADM

## 2024-11-08 RX ORDER — CELECOXIB 200 MG/1
200 CAPSULE ORAL ONCE
Status: COMPLETED | OUTPATIENT
Start: 2024-11-08 | End: 2024-11-08

## 2024-11-08 RX ORDER — SODIUM CHLORIDE, SODIUM LACTATE, POTASSIUM CHLORIDE, CALCIUM CHLORIDE 600; 310; 30; 20 MG/100ML; MG/100ML; MG/100ML; MG/100ML
50 INJECTION, SOLUTION INTRAVENOUS CONTINUOUS
Status: ACTIVE | OUTPATIENT
Start: 2024-11-08 | End: 2024-11-08

## 2024-11-08 RX ORDER — SODIUM CHLORIDE, SODIUM LACTATE, POTASSIUM CHLORIDE, CALCIUM CHLORIDE 600; 310; 30; 20 MG/100ML; MG/100ML; MG/100ML; MG/100ML
INJECTION, SOLUTION INTRAVENOUS CONTINUOUS PRN
Status: DISCONTINUED | OUTPATIENT
Start: 2024-11-08 | End: 2024-11-08

## 2024-11-08 RX ORDER — PROCHLORPERAZINE MALEATE 5 MG
10 TABLET ORAL EVERY 6 HOURS PRN
Status: DISCONTINUED | OUTPATIENT
Start: 2024-11-08 | End: 2024-11-10 | Stop reason: HOSPADM

## 2024-11-08 RX ORDER — OXYCODONE HYDROCHLORIDE 5 MG/1
5 TABLET ORAL EVERY 4 HOURS PRN
Status: DISCONTINUED | OUTPATIENT
Start: 2024-11-08 | End: 2024-11-10 | Stop reason: HOSPADM

## 2024-11-08 RX ORDER — MORPHINE SULFATE 2 MG/ML
2 INJECTION, SOLUTION INTRAMUSCULAR; INTRAVENOUS EVERY 2 HOUR PRN
Status: DISCONTINUED | OUTPATIENT
Start: 2024-11-08 | End: 2024-11-10 | Stop reason: HOSPADM

## 2024-11-08 RX ORDER — ONDANSETRON HYDROCHLORIDE 2 MG/ML
4 INJECTION, SOLUTION INTRAVENOUS ONCE AS NEEDED
Status: DISCONTINUED | OUTPATIENT
Start: 2024-11-08 | End: 2024-11-08 | Stop reason: HOSPADM

## 2024-11-08 RX ORDER — GABAPENTIN 300 MG/1
300 CAPSULE ORAL ONCE
Status: COMPLETED | OUTPATIENT
Start: 2024-11-08 | End: 2024-11-08

## 2024-11-08 RX ORDER — LIDOCAINE HYDROCHLORIDE 20 MG/ML
INJECTION, SOLUTION INFILTRATION; PERINEURAL AS NEEDED
Status: DISCONTINUED | OUTPATIENT
Start: 2024-11-08 | End: 2024-11-08

## 2024-11-08 RX ORDER — ONDANSETRON 4 MG/1
4 TABLET, FILM COATED ORAL EVERY 8 HOURS PRN
Status: DISCONTINUED | OUTPATIENT
Start: 2024-11-08 | End: 2024-11-10 | Stop reason: HOSPADM

## 2024-11-08 RX ORDER — ROCURONIUM BROMIDE 10 MG/ML
INJECTION, SOLUTION INTRAVENOUS AS NEEDED
Status: DISCONTINUED | OUTPATIENT
Start: 2024-11-08 | End: 2024-11-08

## 2024-11-08 SDOH — SOCIAL STABILITY: SOCIAL INSECURITY: ARE YOU OR HAVE YOU BEEN THREATENED OR ABUSED PHYSICALLY, EMOTIONALLY, OR SEXUALLY BY ANYONE?: NO

## 2024-11-08 SDOH — HEALTH STABILITY: MENTAL HEALTH: HOW OFTEN DO YOU HAVE SIX OR MORE DRINKS ON ONE OCCASION?: NEVER

## 2024-11-08 SDOH — SOCIAL STABILITY: SOCIAL INSECURITY: WITHIN THE LAST YEAR, HAVE YOU BEEN HUMILIATED OR EMOTIONALLY ABUSED IN OTHER WAYS BY YOUR PARTNER OR EX-PARTNER?: NO

## 2024-11-08 SDOH — SOCIAL STABILITY: SOCIAL INSECURITY
WITHIN THE LAST YEAR, HAVE YOU BEEN KICKED, HIT, SLAPPED, OR OTHERWISE PHYSICALLY HURT BY YOUR PARTNER OR EX-PARTNER?: NO

## 2024-11-08 SDOH — HEALTH STABILITY: MENTAL HEALTH: HOW MANY DRINKS CONTAINING ALCOHOL DO YOU HAVE ON A TYPICAL DAY WHEN YOU ARE DRINKING?: 1 OR 2

## 2024-11-08 SDOH — SOCIAL STABILITY: SOCIAL INSECURITY: DOES ANYONE TRY TO KEEP YOU FROM HAVING/CONTACTING OTHER FRIENDS OR DOING THINGS OUTSIDE YOUR HOME?: NO

## 2024-11-08 SDOH — ECONOMIC STABILITY: INCOME INSECURITY: IN THE PAST 12 MONTHS HAS THE ELECTRIC, GAS, OIL, OR WATER COMPANY THREATENED TO SHUT OFF SERVICES IN YOUR HOME?: NO

## 2024-11-08 SDOH — SOCIAL STABILITY: SOCIAL INSECURITY: HAS ANYONE EVER THREATENED TO HURT YOUR FAMILY OR YOUR PETS?: NO

## 2024-11-08 SDOH — HEALTH STABILITY: MENTAL HEALTH: HOW OFTEN DO YOU HAVE A DRINK CONTAINING ALCOHOL?: MONTHLY OR LESS

## 2024-11-08 SDOH — ECONOMIC STABILITY: FOOD INSECURITY: WITHIN THE PAST 12 MONTHS, THE FOOD YOU BOUGHT JUST DIDN'T LAST AND YOU DIDN'T HAVE MONEY TO GET MORE.: NEVER TRUE

## 2024-11-08 SDOH — SOCIAL STABILITY: SOCIAL INSECURITY: ARE THERE ANY APPARENT SIGNS OF INJURIES/BEHAVIORS THAT COULD BE RELATED TO ABUSE/NEGLECT?: NO

## 2024-11-08 SDOH — ECONOMIC STABILITY: FOOD INSECURITY: WITHIN THE PAST 12 MONTHS, YOU WORRIED THAT YOUR FOOD WOULD RUN OUT BEFORE YOU GOT THE MONEY TO BUY MORE.: NEVER TRUE

## 2024-11-08 SDOH — SOCIAL STABILITY: SOCIAL INSECURITY: HAVE YOU HAD THOUGHTS OF HARMING ANYONE ELSE?: NO

## 2024-11-08 SDOH — SOCIAL STABILITY: SOCIAL INSECURITY: WITHIN THE LAST YEAR, HAVE YOU BEEN AFRAID OF YOUR PARTNER OR EX-PARTNER?: NO

## 2024-11-08 SDOH — SOCIAL STABILITY: SOCIAL INSECURITY
WITHIN THE LAST YEAR, HAVE YOU BEEN RAPED OR FORCED TO HAVE ANY KIND OF SEXUAL ACTIVITY BY YOUR PARTNER OR EX-PARTNER?: NO

## 2024-11-08 SDOH — HEALTH STABILITY: MENTAL HEALTH: CURRENT SMOKER: 0

## 2024-11-08 SDOH — SOCIAL STABILITY: SOCIAL INSECURITY: ABUSE: ADULT

## 2024-11-08 SDOH — SOCIAL STABILITY: SOCIAL INSECURITY: DO YOU FEEL UNSAFE GOING BACK TO THE PLACE WHERE YOU ARE LIVING?: NO

## 2024-11-08 SDOH — SOCIAL STABILITY: SOCIAL INSECURITY: HAVE YOU HAD ANY THOUGHTS OF HARMING ANYONE ELSE?: NO

## 2024-11-08 SDOH — SOCIAL STABILITY: SOCIAL INSECURITY: DO YOU FEEL ANYONE HAS EXPLOITED OR TAKEN ADVANTAGE OF YOU FINANCIALLY OR OF YOUR PERSONAL PROPERTY?: NO

## 2024-11-08 SDOH — SOCIAL STABILITY: SOCIAL INSECURITY: WERE YOU ABLE TO COMPLETE ALL THE BEHAVIORAL HEALTH SCREENINGS?: YES

## 2024-11-08 ASSESSMENT — ACTIVITIES OF DAILY LIVING (ADL)
PATIENT'S MEMORY ADEQUATE TO SAFELY COMPLETE DAILY ACTIVITIES?: YES
ADEQUATE_TO_COMPLETE_ADL: YES
BATHING: INDEPENDENT
ASSISTIVE_DEVICE: HEARING AID - RIGHT;HEARING AID - LEFT
DRESSING YOURSELF: INDEPENDENT
FEEDING YOURSELF: INDEPENDENT
TOILETING: INDEPENDENT
WALKS IN HOME: INDEPENDENT
HEARING - LEFT EAR: HEARING AID
JUDGMENT_ADEQUATE_SAFELY_COMPLETE_DAILY_ACTIVITIES: YES
GROOMING: INDEPENDENT
HEARING - RIGHT EAR: HEARING AID
LACK_OF_TRANSPORTATION: NO

## 2024-11-08 ASSESSMENT — PATIENT HEALTH QUESTIONNAIRE - PHQ9
1. LITTLE INTEREST OR PLEASURE IN DOING THINGS: NOT AT ALL
2. FEELING DOWN, DEPRESSED OR HOPELESS: NOT AT ALL
SUM OF ALL RESPONSES TO PHQ9 QUESTIONS 1 & 2: 0

## 2024-11-08 ASSESSMENT — PAIN DESCRIPTION - LOCATION: LOCATION: SHOULDER

## 2024-11-08 ASSESSMENT — COGNITIVE AND FUNCTIONAL STATUS - GENERAL
DRESSING REGULAR UPPER BODY CLOTHING: A LITTLE
DRESSING REGULAR LOWER BODY CLOTHING: A LITTLE
MOVING TO AND FROM BED TO CHAIR: A LITTLE
MOBILITY SCORE: 18
PATIENT BASELINE BEDBOUND: NO
TURNING FROM BACK TO SIDE WHILE IN FLAT BAD: A LITTLE
DRESSING REGULAR LOWER BODY CLOTHING: A LITTLE
DRESSING REGULAR UPPER BODY CLOTHING: A LITTLE
HELP NEEDED FOR BATHING: A LITTLE
HELP NEEDED FOR BATHING: A LITTLE
STANDING UP FROM CHAIR USING ARMS: A LITTLE
CLIMB 3 TO 5 STEPS WITH RAILING: A LITTLE
PERSONAL GROOMING: A LITTLE
EATING MEALS: A LITTLE
WALKING IN HOSPITAL ROOM: A LITTLE
MOVING TO AND FROM BED TO CHAIR: A LITTLE
TOILETING: A LITTLE
EATING MEALS: A LITTLE
MOVING FROM LYING ON BACK TO SITTING ON SIDE OF FLAT BED WITH BEDRAILS: A LITTLE
MOBILITY SCORE: 18
TOILETING: A LITTLE
DAILY ACTIVITIY SCORE: 18
STANDING UP FROM CHAIR USING ARMS: A LITTLE
MOVING FROM LYING ON BACK TO SITTING ON SIDE OF FLAT BED WITH BEDRAILS: A LITTLE
DAILY ACTIVITIY SCORE: 18
CLIMB 3 TO 5 STEPS WITH RAILING: A LITTLE
WALKING IN HOSPITAL ROOM: A LITTLE
TURNING FROM BACK TO SIDE WHILE IN FLAT BAD: A LITTLE
PERSONAL GROOMING: A LITTLE

## 2024-11-08 ASSESSMENT — PAIN - FUNCTIONAL ASSESSMENT
PAIN_FUNCTIONAL_ASSESSMENT: 0-10

## 2024-11-08 ASSESSMENT — PAIN SCALES - GENERAL
PAINLEVEL_OUTOF10: 0 - NO PAIN
PAIN_LEVEL: 0
PAINLEVEL_OUTOF10: 6
PAINLEVEL_OUTOF10: 0 - NO PAIN
PAINLEVEL_OUTOF10: 4

## 2024-11-08 ASSESSMENT — PAIN DESCRIPTION - DESCRIPTORS: DESCRIPTORS: DISCOMFORT

## 2024-11-08 ASSESSMENT — LIFESTYLE VARIABLES
SKIP TO QUESTIONS 9-10: 1
AUDIT-C TOTAL SCORE: 1

## 2024-11-08 ASSESSMENT — COLUMBIA-SUICIDE SEVERITY RATING SCALE - C-SSRS
2. HAVE YOU ACTUALLY HAD ANY THOUGHTS OF KILLING YOURSELF?: NO
6. HAVE YOU EVER DONE ANYTHING, STARTED TO DO ANYTHING, OR PREPARED TO DO ANYTHING TO END YOUR LIFE?: NO
1. IN THE PAST MONTH, HAVE YOU WISHED YOU WERE DEAD OR WISHED YOU COULD GO TO SLEEP AND NOT WAKE UP?: NO

## 2024-11-08 ASSESSMENT — PAIN DESCRIPTION - ORIENTATION: ORIENTATION: RIGHT

## 2024-11-08 NOTE — ANESTHESIA PROCEDURE NOTES
Airway  Date/Time: 11/8/2024 1:55 PM  Urgency: elective    Airway not difficult    Staffing  Performed: CRNA and SRNA   Authorized by: Josue Vera MD    Performed by: TONA Ronquillo-CRNA, DNAP  Patient location during procedure: OR    Indications and Patient Condition  Indications for airway management: anesthesia  Spontaneous Ventilation: absent  Sedation level: deep  Preoxygenated: yes  Patient position: sniffing  MILS maintained throughout  Mask difficulty assessment: 2 - vent by mask + OA or adjuvant +/- NMBA  Planned trial extubation    Final Airway Details  Final airway type: endotracheal airway      Successful airway: ETT  Cuffed: yes   Successful intubation technique: direct laryngoscopy  Facilitating devices/methods: intubating stylet  Endotracheal tube insertion site: oral  Blade: Samina  Blade size: #3  ETT size (mm): 7.5  Cormack-Lehane Classification: grade IIb - view of arytenoids or posterior of glottis only  Placement verified by: capnometry   Measured from: lips  ETT to lips (cm): 22  Number of attempts at approach: 1  Ventilation between attempts: none  Number of other approaches attempted: 0
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
## 2024-11-08 NOTE — OP NOTE
Arthroplasty Reverse Shoulder (R) Operative Note  Preop diagnosis: Right shoulder osteoarthritis with rotator cuff arthropathy    Postop diagnosis: Right shoulder osteoarthritis with rotator cuff arthropathy      Assistant: Tim Trevino physician assistant (PAC) was required and present throughout the entire case.  Given the nature of the disease process and the procedure, a skilled surgical first assistant was necessary during the entire case.  The assistant was necessary to hold retractors and manipulate extremity during the procedure.  A certified scrub tech was also present at the back table managing instruments with supplies for the surgical case    Date: 2024  OR Location: [facility]    Name: Fabian Romero, : 1940, Age: 84 y.o., MRN: [id number], Sex: male    Diagnosis  Pre-op Diagnosis      * Degenerative joint disease of shoulder, right [M19.011] Post-op Diagnosis     * Degenerative joint disease of shoulder, right [M19.011]     * Rotator cuff arthropathy of right shoulder [M12.811]     Procedures  Arthroplasty Reverse Shoulder  89634 - SD ARTHROPLASTY GLENOHUMERAL JOINT TOTAL SHOULDER      Surgeons      * Tim Salas - Primary    Resident/Fellow/Other Assistant:  Surgeons and Role:  * No surgeons found with a matching role *    Staff:   Circulator: Marycarmen Ramos Person: Ruth    Anesthesia Staff: Anesthesiologist: Josue Vera MD  CRNA: TONA Ronquillo-CAROLE, [facility]    Procedure Summary  Anesthesia: General  ASA: III  Estimated Blood Loss: 100 mL  Intra-op Medications:   Administrations occurring from 1330 to 1500 on 24:   Medication Name Total Dose   sterile water irrigation solution 1,000 mL   dexAMETHasone 4 mg/mL 4 mg   fentaNYL PF 0.05 mg/mL 50 mcg   LR infusion 62.5 mL   lidocaine (Xylocaine) injection 2 % 80 mg   propofol (Diprivan) injection 10 mg/mL 150 mg   rocuronium (ZeMuron) 50 mg/5 mL injection 70 mg   ceFAZolin (Ancef) 2 g in dextrose (iso)   mL 2 g              Anesthesia Record               Intraprocedure I/O Totals       None           Specimen: No specimens collected              Drains and/or Catheters: * None in log *    Tourniquet Times:         Implants:     Findings: see procedure details    Indications: Fabian Romero is an 84 y.o. male who is having surgery for Degenerative joint disease of shoulder, right [M19.011].     The patient was seen in the preoperative area. The risks, benefits, complications, treatment options, non-operative alternatives, expected recovery and outcomes were discussed with the patient. The possibilities of reaction to medication, pulmonary aspiration, injury to surrounding structures, bleeding, recurrent infection, the need for additional procedures, failure to diagnose a condition, and creating a complication requiring transfusion or operation were discussed with the patient. The patient concurred with the proposed plan, giving informed consent.  The site of surgery was properly noted/marked if necessary per policy. The patient has been actively warmed in preoperative area. Preoperative antibiotics have been ordered and given within 1 hours of incision.    Procedure Details:   The patient was placed in the beachchair position and underwent routine prep and drape of the right shoulder  after induction of anesethia    Standard deltopectoral approach was used splitting skin for roughly 8-10 cm starting at the coracoid and extending down the arm.  Subcutaneous flaps were created and we exposed the deltopectoral interval.  Cephalic vein and deltoid were taken superior laterally and the pectoralis major released inferiorly.  We released part of the pectoralis off the humerus to aid with exposure.  We bluntly dissected under the conjoined tendon and placed a retractor medially against the conjoined tendon to protect the neurovascular structures.  Laterally we retracted off the deltoid attacking the vein.    The  "subscapularis partly intact.  The inferior two thirds was peeled off the lesser tuberosity.  It was tagged to be repaired later at the end of the case through if it hold the prosthesis with a #2 FiberWire.  There was severe end-stage arthrosis with significant bony erosion and posterior wear of the glenoid and humeral head.  There was absent supraspinatus tendon superiorly.  We proceeded with reverse total shoulder replacement      The long head of the biceps tendon was noticeably absent.      The humeral head was easily dislocated at this time with external rotation.  The osteophytes were removed circumferentially around the humerus and we began hand reaming the humerus to a final diameter of 16 mm.    Using the appropriate cutting guide the humeral head was cut at 20° retroversion and further osteophytes were cleaned removed posteriorly to aid in the exposure and implant insertion.    At this time retractors were placed on the posteriorly,  superiorly,  and anteriorly along the glenoid rim to allow for complete exposure to the glenoid face.   We then cleaned and removed degenerative labral tissue and old bicep tissue from around the rim of the glenoid to further provide exposure to the glenoid.     A centering pin was placed in the glenoid to correct version, a centering hole was then created and we planed the glenoid to a smooth flush surface.  After final preparation the baseplate was inserted with a 15 mm stem.  The purchase was excellent and we proceeded to fill the baseplate with 2 bicortical screw 38 mm and 38 mm  in length, and then placed  \"end caps\" on the screws so as to convert them to locking screws.  A 40 mm glenosphere with +5 mm of lateral offset was fixed on the baseplate and its locking mechanism was also confirmed.    At this time final preparation of the humeral stem was completed and we inserted the 16 mm reverse compatible trabecular metal stem at 20° retroversion at the appropriate height and " inclination.    We then trialed liners and after multiple reductions we placed a final buildup of 12 mm metallic spacer with a 3 mm Chayo liner with a 65 degree constraint radius.    Once we completed the reduction of the shoulder, it was stable in all planes checked with no subcoracoid or subacromial impingement.  We lavaged and  irrigated and we closed what remnant of the subscapularis remained to  further enhance  stability.  Final  lavaging and irrigaion throughout the entire wound was completed and  hemostasis was confirmed.      We allow the deltopectoral interval to close loosely with 2-0 Vicryl, closure of the subcutaneous tissue with 2-0 Vicryl and a running 4-0 Monocryl was completed followed by application of a compressive dressing.  A drain was used if needed and the patient was taken to recovery room and an xr was taken and is pending review.      Complications:  None; patient tolerated the procedure well.    Disposition: PACU - hemodynamically stable.  Condition: stable         Tim Salas  Phone Number: [phone]

## 2024-11-08 NOTE — ANESTHESIA PROCEDURE NOTES
Peripheral Block    Patient location during procedure: holding area  Start time: 11/8/2024 1:02 PM  End time: 11/8/2024 1:07 PM  Reason for block: at surgeon's request and post-op pain management  Staffing  Performed: attending   Authorized by: Josue Vera MD    Performed by: Josue Vera MD  Preanesthetic Checklist  Completed: patient identified, IV checked, site marked, risks and benefits discussed, surgical consent, monitors and equipment checked, pre-op evaluation and timeout performed   Timeout performed at: 11/8/2024 1:02 PM  Peripheral Block  Patient position: laying flat  Prep: ChloraPrep  Patient monitoring: heart rate, cardiac monitor and continuous pulse ox  Block type: supraclavicular  Laterality: right  Injection technique: single-shot  Guidance: ultrasound guided  Local infiltration: lidocaine  Infiltration strength: 1 %  Dose: 1 mL  Needle  Needle type: short-bevel   Needle gauge: 21 G  Needle length: 10 cm  Needle localization: ultrasound guidance     image stored in chart  Needle insertion depth: 2 cm  Assessment  Injection assessment: negative aspiration for heme, no paresthesia on injection, incremental injection and local visualized surrounding nerve on ultrasound  Paresthesia pain: none  Heart rate change: no  Slow fractionated injection: yes  Additional Notes  Risks, benefits, complications and alternatives discussed with patient.  Patient agrees to proceed with procedure.  Midazolam 3mg IV.  US guided, lateral IP approach.  Total of 20cc 0.5% ropivacaine with epi 1:200k and decadron 5mg injected in 3-5cc aliquots around plexus after negative aspirations.  No complications noted.

## 2024-11-08 NOTE — DISCHARGE INSTRUCTIONS
Total Shoulder Replacement  Discharge Instructions    Surgical Site Care:  Change dressing once a day and PRN (as needed). Apply 4 x 4 sponge and light tape. If glue present, leave open to air.  You may leave wound open to air after initial dressing removal, if wound is clean, dry and intact  If Aquacel Ag dressing is present, do not remove dressing for 7 days, unless heavily saturated. If heavily saturated, remove dressing and start using instructions above  Staples will be removed on post-operative day 14 and steri-strips applied  Showering is permitted starting POD1 if waterproof Aquacel dressing is present or when the incision is covered with 4 x 4 and Tegaderm waterproof dressing  Until all areas of incision are healed.    Physical Therapy:  Weight Bearing Status:  NWB ( none weight bearing)  No ROM of Shoulder  Active and passive range of motion of elbow, wrist, hand  Per Physical Therapy handout  Sling to be applied at all times when out of bed. Ok to remove sling for hygiene and when awake in bed with support    Pain Medications  You were given  Oxycodone  Wean off pain medications as you deem appropriate as long as pain is under control  Do not exceed 4,000 mg of acetaminophen from all sources in a 24 hour period  Contact the [facility] if you notice any reactions to the medication or need a refill    Cold packs/Ice packs/Machine  May be used 5 times daily for 15-30 minutes as necessary  Be sure to have a barrier (cloth, clothing, towel) between the site and the ice pack to prevent frostbite    Contact the [facility] office if  Increased redness, swelling, drainage of any kind, and/or pain to surgery site.  As well as new onset fevers and or chills.  These could signify an infection.  Arm tenderness to touch as well as increased swelling or redness.  This could signify a clot formation.  Numbness or tingling to an area around the incision site or below the incision site  (fingers).  Any rash appears, increased  or new onset nausea/vomiting occur.  This may indicate a reaction to a medication.  Phone # [phone].  Follow up with Surgeon in 2 weeks

## 2024-11-08 NOTE — ANESTHESIA POSTPROCEDURE EVALUATION
Patient: Fabian Romero    Procedure Summary       Date: 11/08/24 Room / Location: [facility] 04 / [facility]    Anesthesia Start: 1345 Anesthesia Stop: 1548    Procedure: Arthroplasty Reverse Shoulder (Right: Shoulder) Diagnosis:       Degenerative joint disease of shoulder, right      Rotator cuff arthropathy of right shoulder      (Degenerative joint disease of shoulder, right [M19.011])    Surgeons: Tim Salas MD Responsible Provider: Josue Vera MD    Anesthesia Type: general ASA Status: 3            Anesthesia Type: general    Vitals Value Taken Time   /72 11/08/24 1546   Temp 36 11/08/24 1555   Pulse 84 11/08/24 1554   Resp 18 11/08/24 1554   SpO2 98 % 11/08/24 1554   Vitals shown include unfiled device data.    Anesthesia Post Evaluation    Patient location during evaluation: PACU  Patient participation: complete - patient participated  Level of consciousness: sleepy but conscious  Pain score: 0  Pain management: adequate  Multimodal analgesia pain management approach  Airway patency: patent  Cardiovascular status: acceptable  Respiratory status: acceptable and face mask  Hydration status: acceptable  Postoperative Nausea and Vomiting: none        No notable events documented.

## 2024-11-09 ENCOUNTER — PHARMACY VISIT (OUTPATIENT)
Dept: PHARMACY | Facility: CLINIC | Age: 84
End: 2024-11-09
Payer: COMMERCIAL

## 2024-11-09 PROBLEM — M19.011 DEGENERATIVE JOINT DISEASE OF SHOULDER, RIGHT: Status: RESOLVED | Noted: 2024-11-08 | Resolved: 2024-11-09

## 2024-11-09 LAB
ANION GAP SERPL CALC-SCNC: 8 MMOL/L (ref 10–20)
BUN SERPL-MCNC: 27 MG/DL (ref 6–23)
CALCIUM SERPL-MCNC: 8.7 MG/DL (ref 8.6–10.3)
CHLORIDE SERPL-SCNC: 104 MMOL/L (ref 98–107)
CO2 SERPL-SCNC: 29 MMOL/L (ref 21–32)
CREAT SERPL-MCNC: 0.79 MG/DL (ref 0.5–1.3)
EGFRCR SERPLBLD CKD-EPI 2021: 89 ML/MIN/1.73M*2
ERYTHROCYTE [DISTWIDTH] IN BLOOD BY AUTOMATED COUNT: 15.1 % (ref 11.5–14.5)
GLUCOSE SERPL-MCNC: 140 MG/DL (ref 74–99)
HCT VFR BLD AUTO: 33.7 % (ref 41–52)
HGB BLD-MCNC: 11.4 G/DL (ref 13.5–17.5)
MCH RBC QN AUTO: 33.7 PG (ref 26–34)
MCHC RBC AUTO-ENTMCNC: 33.8 G/DL (ref 32–36)
MCV RBC AUTO: 100 FL (ref 80–100)
NRBC BLD-RTO: 0.2 /100 WBCS (ref 0–0)
PLATELET # BLD AUTO: 200 X10*3/UL (ref 150–450)
POTASSIUM SERPL-SCNC: 5.3 MMOL/L (ref 3.5–5.3)
RBC # BLD AUTO: 3.38 X10*6/UL (ref 4.5–5.9)
SODIUM SERPL-SCNC: 136 MMOL/L (ref 136–145)
WBC # BLD AUTO: 13.3 X10*3/UL (ref 4.4–11.3)

## 2024-11-09 PROCEDURE — 2500000002 HC RX 250 W HCPCS SELF ADMINISTERED DRUGS (ALT 637 FOR MEDICARE OP, ALT 636 FOR OP/ED)

## 2024-11-09 PROCEDURE — RXMED WILLOW AMBULATORY MEDICATION CHARGE

## 2024-11-09 PROCEDURE — 2500000002 HC RX 250 W HCPCS SELF ADMINISTERED DRUGS (ALT 637 FOR MEDICARE OP, ALT 636 FOR OP/ED): Performed by: ORTHOPAEDIC SURGERY

## 2024-11-09 PROCEDURE — 2500000004 HC RX 250 GENERAL PHARMACY W/ HCPCS (ALT 636 FOR OP/ED): Mod: JZ | Performed by: ORTHOPAEDIC SURGERY

## 2024-11-09 PROCEDURE — 80048 BASIC METABOLIC PNL TOTAL CA: CPT | Performed by: ORTHOPAEDIC SURGERY

## 2024-11-09 PROCEDURE — 2500000001 HC RX 250 WO HCPCS SELF ADMINISTERED DRUGS (ALT 637 FOR MEDICARE OP): Performed by: ORTHOPAEDIC SURGERY

## 2024-11-09 PROCEDURE — 97161 PT EVAL LOW COMPLEX 20 MIN: CPT | Mod: GP | Performed by: PHYSICAL THERAPIST

## 2024-11-09 PROCEDURE — 7100000011 HC EXTENDED STAY RECOVERY HOURLY - NURSING UNIT

## 2024-11-09 PROCEDURE — 36415 COLL VENOUS BLD VENIPUNCTURE: CPT | Performed by: ORTHOPAEDIC SURGERY

## 2024-11-09 PROCEDURE — 97530 THERAPEUTIC ACTIVITIES: CPT | Mod: GO

## 2024-11-09 PROCEDURE — 97535 SELF CARE MNGMENT TRAINING: CPT | Mod: GO

## 2024-11-09 PROCEDURE — 97165 OT EVAL LOW COMPLEX 30 MIN: CPT | Mod: GO

## 2024-11-09 PROCEDURE — 2500000004 HC RX 250 GENERAL PHARMACY W/ HCPCS (ALT 636 FOR OP/ED): Mod: JZ

## 2024-11-09 PROCEDURE — 85027 COMPLETE CBC AUTOMATED: CPT | Performed by: ORTHOPAEDIC SURGERY

## 2024-11-09 PROCEDURE — 2500000001 HC RX 250 WO HCPCS SELF ADMINISTERED DRUGS (ALT 637 FOR MEDICARE OP)

## 2024-11-09 RX ORDER — TIZANIDINE HYDROCHLORIDE 2 MG/1
2 CAPSULE, GELATIN COATED ORAL 3 TIMES DAILY PRN
Qty: 21 CAPSULE | Refills: 0 | Status: SHIPPED | OUTPATIENT
Start: 2024-11-09 | End: 2024-11-09 | Stop reason: HOSPADM

## 2024-11-09 RX ORDER — DOCUSATE SODIUM 100 MG/1
100 CAPSULE, LIQUID FILLED ORAL 2 TIMES DAILY
Qty: 20 CAPSULE | Refills: 0 | Status: SHIPPED | OUTPATIENT
Start: 2024-11-09 | End: 2024-11-19

## 2024-11-09 RX ORDER — CARVEDILOL 12.5 MG/1
12.5 TABLET ORAL 2 TIMES DAILY
Status: DISCONTINUED | OUTPATIENT
Start: 2024-11-09 | End: 2024-11-10 | Stop reason: HOSPADM

## 2024-11-09 RX ORDER — OXYCODONE HYDROCHLORIDE 5 MG/1
5 TABLET ORAL EVERY 6 HOURS PRN
Qty: 28 TABLET | Refills: 0 | Status: SHIPPED | OUTPATIENT
Start: 2024-11-09 | End: 2024-11-16

## 2024-11-09 RX ORDER — ROSUVASTATIN CALCIUM 10 MG/1
5 TABLET, COATED ORAL NIGHTLY
Status: DISCONTINUED | OUTPATIENT
Start: 2024-11-09 | End: 2024-11-10 | Stop reason: HOSPADM

## 2024-11-09 RX ORDER — ACETAMINOPHEN 325 MG/1
650 TABLET ORAL EVERY 6 HOURS SCHEDULED
Qty: 56 TABLET | Refills: 0 | Status: SHIPPED | OUTPATIENT
Start: 2024-11-09 | End: 2024-11-16

## 2024-11-09 RX ORDER — CEFAZOLIN SODIUM 2 G/100ML
2 INJECTION, SOLUTION INTRAVENOUS EVERY 8 HOURS
Status: DISCONTINUED | OUTPATIENT
Start: 2024-11-09 | End: 2024-11-10 | Stop reason: HOSPADM

## 2024-11-09 RX ORDER — CEFAZOLIN SODIUM 2 G/50ML
2 SOLUTION INTRAVENOUS ONCE
Status: DISCONTINUED | OUTPATIENT
Start: 2024-11-09 | End: 2024-11-09

## 2024-11-09 RX ORDER — TIZANIDINE 2 MG/1
2 TABLET ORAL EVERY 8 HOURS PRN
Qty: 21 TABLET | Refills: 0 | Status: SHIPPED | OUTPATIENT
Start: 2024-11-09 | End: 2024-11-16

## 2024-11-09 RX ORDER — ASPIRIN 81 MG/1
81 TABLET ORAL DAILY
Status: DISCONTINUED | OUTPATIENT
Start: 2024-11-09 | End: 2024-11-10 | Stop reason: HOSPADM

## 2024-11-09 ASSESSMENT — COGNITIVE AND FUNCTIONAL STATUS - GENERAL
TURNING FROM BACK TO SIDE WHILE IN FLAT BAD: A LITTLE
HELP NEEDED FOR BATHING: A LOT
TURNING FROM BACK TO SIDE WHILE IN FLAT BAD: A LITTLE
STANDING UP FROM CHAIR USING ARMS: A LITTLE
TOILETING: A LITTLE
WALKING IN HOSPITAL ROOM: A LITTLE
MOVING FROM LYING ON BACK TO SITTING ON SIDE OF FLAT BED WITH BEDRAILS: A LITTLE
PERSONAL GROOMING: A LOT
STANDING UP FROM CHAIR USING ARMS: A LITTLE
CLIMB 3 TO 5 STEPS WITH RAILING: A LITTLE
HELP NEEDED FOR BATHING: A LITTLE
TOILETING: A LITTLE
DRESSING REGULAR LOWER BODY CLOTHING: A LITTLE
DAILY ACTIVITIY SCORE: 18
CLIMB 3 TO 5 STEPS WITH RAILING: A LITTLE
DRESSING REGULAR UPPER BODY CLOTHING: A LITTLE
MOBILITY SCORE: 18
MOBILITY SCORE: 18
DAILY ACTIVITIY SCORE: 15
EATING MEALS: A LITTLE
WALKING IN HOSPITAL ROOM: A LITTLE
MOVING TO AND FROM BED TO CHAIR: A LITTLE
DRESSING REGULAR UPPER BODY CLOTHING: A LOT
DRESSING REGULAR LOWER BODY CLOTHING: A LOT
MOVING FROM LYING ON BACK TO SITTING ON SIDE OF FLAT BED WITH BEDRAILS: A LITTLE
MOVING TO AND FROM BED TO CHAIR: A LITTLE
PERSONAL GROOMING: A LITTLE

## 2024-11-09 ASSESSMENT — PAIN DESCRIPTION - ORIENTATION
ORIENTATION: RIGHT

## 2024-11-09 ASSESSMENT — PAIN SCALES - GENERAL
PAINLEVEL_OUTOF10: 6
PAINLEVEL_OUTOF10: 4
PAINLEVEL_OUTOF10: 2
PAINLEVEL_OUTOF10: 6
PAINLEVEL_OUTOF10: 0 - NO PAIN
PAINLEVEL_OUTOF10: 0 - NO PAIN
PAINLEVEL_OUTOF10: 5 - MODERATE PAIN
PAINLEVEL_OUTOF10: 0 - NO PAIN

## 2024-11-09 ASSESSMENT — PAIN - FUNCTIONAL ASSESSMENT
PAIN_FUNCTIONAL_ASSESSMENT: 0-10

## 2024-11-09 ASSESSMENT — PAIN DESCRIPTION - LOCATION
LOCATION: SHOULDER

## 2024-11-09 ASSESSMENT — ACTIVITIES OF DAILY LIVING (ADL)
BATHING_ASSISTANCE: MODERATE
HOME_MANAGEMENT_TIME_ENTRY: 20

## 2024-11-09 NOTE — CARE PLAN
The patient's goals for the shift include      The clinical goals for the shift include Patient will remain safe and HD stable    Problem: Fall/Injury  Goal: Not fall by end of shift  Outcome: Progressing  Goal: Be free from injury by end of the shift  Outcome: Progressing  Goal: Verbalize understanding of personal risk factors for fall in the hospital  Outcome: Progressing  Goal: Verbalize understanding of risk factor reduction measures to prevent injury from fall in the home  Outcome: Progressing  Goal: Use assistive devices by end of the shift  Outcome: Progressing  Goal: Pace activities to prevent fatigue by end of the shift  Outcome: Progressing     Problem: Pain  Goal: Takes deep breaths with improved pain control throughout the shift  Outcome: Progressing  Goal: Turns in bed with improved pain control throughout the shift  Outcome: Progressing  Goal: Walks with improved pain control throughout the shift  Outcome: Progressing  Goal: Performs ADL's with improved pain control throughout shift  Outcome: Progressing  Goal: Participates in PT with improved pain control throughout the shift  Outcome: Progressing  Goal: Free from opioid side effects throughout the shift  Outcome: Progressing  Goal: Free from acute confusion related to pain meds throughout the shift  Outcome: Progressing     Problem: Safety - Adult  Goal: Free from fall injury  11/9/2024 0737 by Neil Pelaez RN  Outcome: Progressing  11/8/2024 2149 by Neil Pelaez RN  Flowsheets (Taken 11/8/2024 2149)  Free from fall injury: Instruct family/caregiver on patient safety     Problem: Discharge Planning  Goal: Discharge to home or other facility with appropriate resources  11/9/2024 0737 by Neil Pelaez RN  Outcome: Progressing  11/8/2024 2149 by Neil Pelaez RN  Flowsheets (Taken 11/8/2024 2149)  Discharge to home or other facility with appropriate resources:   Identify barriers to discharge with patient and caregiver   Arrange for needed  discharge resources and transportation as appropriate   Identify discharge learning needs (meds, wound care, etc)   Arrange for interpreters to assist at discharge as needed   Refer to discharge planning if patient needs post-hospital services based on physician order or complex needs related to functional status, cognitive ability or social support system     Problem: Chronic Conditions and Co-morbidities  Goal: Patient's chronic conditions and co-morbidity symptoms are monitored and maintained or improved  11/9/2024 0737 by Neil Pelaez RN  Outcome: Progressing  11/8/2024 2149 by Neil Pelaez RN  Flowsheets (Taken 11/8/2024 2149)  Care Plan - Patient's Chronic Conditions and Co-Morbidity Symptoms are Monitored and Maintained or Improved:   Monitor and assess patient's chronic conditions and comorbid symptoms for stability, deterioration, or improvement   Collaborate with multidisciplinary team to address chronic and comorbid conditions and prevent exacerbation or deterioration   Update acute care plan with appropriate goals if chronic or comorbid symptoms are exacerbated and prevent overall improvement and discharge

## 2024-11-09 NOTE — PROGRESS NOTES
Physical Therapy    Physical Therapy Evaluation    Patient Name: Fabian Romero  MRN: [id number]  Today's Date: 11/9/2024   Time Calculation  Start Time: 0745  Stop Time: 0808  Time Calculation (min): 23 min  609/609-A    Assessment/Plan   PT Assessment  PT Assessment Results: Decreased mobility  Rehab Prognosis: Good  Barriers to Discharge: None from a PT standpoint  Evaluation/Treatment Tolerance: Patient tolerated treatment well  Medical Staff Made Aware: Yes  Strengths: Support of Caregivers, Living arrangement secure, Housing layout, Access to adaptive/assistive products  End of Session Communication: Bedside nurse  Assessment Comment: Recommend Pt. amb with cane at home to assist with balance. Pt. verbalized understandng  End of Session Patient Position: Up in chair, Alarm off, not on at start of session (Call light within reach)  IP OR SWING BED PT PLAN  Inpatient or Swing Bed: Inpatient  PT Plan  PT Plan: PT Eval only  PT Eval Only Reason: No acute PT needs identified  PT Frequency: PT eval only  PT Discharge Recommendations: No further acute PT  PT Recommended Transfer Status: Stand by assist  Physical Therapy eval completed per MD requisition. P.T. recommendations as outlined above. Recommend D/C from acute care when medically appropriate as deemed by medical staff.    Subjective           General Visit Information:  General  Reason for Referral: impaired mobility  Referred By: Dr. ANA LILIA Salas (PT/OT 11/8)  Past Medical History Relevant to Rehab: includes: HTN, HLD, depression, anxiety, A-fib, PUD, [place], PTSD, SOB, post-op delurium (per Pt.) L shoulder surgery  Family/Caregiver Present: No  Prior to Session Communication: Bedside nurse  Patient Position Received: Up in chair, Alarm off, not on at start of session  Preferred Learning Style: auditory, verbal  General Comment: Pt. is an 83yo who presented to [facility] on 11/8/2024 for a scheduled R reverse TSA.    Home Living:  Home Living  Home Living Comments: Pt.  lives with his spouse in a 1 level house in a senior living comminity with 1 [address]ELY[address]. Walkin shower with a seat and grab bars. Laundry on 1st floor.    Prior Level of Function:  Prior Function Per Pt/Caregiver Report  Prior Function Comments: Pt. amb without AD except for a cane when shopping and a walking stick when on uneven terrain. Pt. wa sI with ADLs ad shaired IADLs wtih spouse PTA. Pt. denied falls in last 3 months. Pt. drove PTA. Pt. is R handed.    Precautions:  Precautions  UE Weight Bearing Status: Right Non-Weight Bearing  Post-Surgical Precautions:  (No AROM R shoulder; Sling R UE at all times except OK to remove with therapy; OK ROM elbow, wrist, hand, fingers; NWB R UE)  Precautions Comment: PEr EMR: High fall risk         Objective     Pain:  Pain Assessment  Pain Assessment: 0-10  0-10 (Numeric) Pain Score: 0 - No pain  Pain Interventions: Repositioned, Cold applied    Cognition:  Cognition  Overall Cognitive Status: Within Functional Limits    General Assessments:  General Observation  General Observation: KONSTANTIN drain R UE   Activity Tolerance  Endurance:  (Fair)  Sensation  Light Touch:  (R hand remains numb from surgical anesthesia block)              Dynamic Sitting Balance  Dynamic Sitting-Comments: Good static and dynamic sitting balance  Dynamic Standing Balance  Dynamic Standing-Comments: Good- static and Fair+ dynmaic standing balance    Functional Assessments:     Bed Mobility  Bed Mobility: No  Transfers  Transfer: Yes  Transfer 1  Technique 1: Sit to stand, Stand to sit  Transfer Device 1:  (No AD)  Transfer Level of Assistance 1:  (SBA)  Trials/Comments 1: SBA for safety  Ambulation/Gait Training  Ambulation/Gait Training Performed: Yes  Ambulation/Gait Training 1  Surface 1: Level tile  Device 1: No device  Gait Support Devices: Upper extremity sling  Assistance 1: Minimum assistance  Quality of Gait 1: Wide base of support (slow, hesitant, reciprocla gait wtih shortened step lengths,  increased B trunk sway.)  Comments/Distance (ft) 1: 10'; A for balance, safety.  Ambulation/Gait Training 2  Surface 2: Level tile  Device 2: Single point cane  Gait Support Devices: Upper extremity sling  Assistance 2:  (CGA progressing to SBA)  Quality of Gait 2: Wide base of support ([place], rciprocla gait with improved trunk control)  Comments/Distance (ft) 2: 50'; CGA to SBA for safety  Stairs  Stairs: No       Extremity/Trunk Assessments:  RUE   RUE :  (N/A)  LUE   LUE: Within Functional Limits  RLE   RLE : Within Functional Limits  LLE   LLE : Within Functional Limits    Outcome Measures:     [facility] Basic Mobility  Turning from your back to your side while in a flat bed without using bedrails: A little  Moving from lying on your back to sitting on the side of a flat bed without using bedrails: A little  Moving to and from bed to chair (including a wheelchair): A little  Standing up from a chair using your arms (e.g. wheelchair or bedside chair): A little  To walk in hospital room: A little  Climbing 3-5 steps with railing: A little  Basic Mobility - Total Score: 18                                                               Education Documentation  Precautions, taught by Misbah Tovar PT at 11/9/2024  2:03 PM.  Learner: Patient  Readiness: Acceptance  Method: Explanation  Response: Verbalizes Understanding  Comment: Role of PT, transfers, amb, safety, R UE precautions    Mobility Training, taught by Misbah Tovar PT at 11/9/2024  2:03 PM.  Learner: Patient  Readiness: Acceptance  Method: Explanation  Response: Verbalizes Understanding  Comment: Role of PT, transfers, amb, safety, R UE precautions

## 2024-11-09 NOTE — PROGRESS NOTES
Occupational Therapy    Evaluation/Treatment    Patient Name: Fabian Romero  MRN: [id number]  : 1940  Today's Date: 24  Time Calculation  Start Time: 829  Stop Time: 918  Time Calculation (min): 49 min     609/609-A    Assessment:  OT Assessment: OT presents with impairments in ADLS and decreased balance with functional moblilty, will benefit from con't skilled OT to address impairments  Prognosis: Good  Barriers to Discharge: None  End of Session Communication: Bedside nurse  End of Session Patient Position: Bed, 3 rail up  OT Assessment Results: Decreased ADL status, Decreased endurance, Decreased functional mobility  Prognosis: Good  Barriers to Discharge: None    Plan:  Treatment Interventions: ADL retraining, Functional transfer training, Endurance training, Patient/family training  OT Frequency: 3 times per week  OT Discharge Recommendations: Low intensity level of continued care  OT Recommended Transfer Status: Stand by assist  OT - OK to Discharge: Yes  Treatment Interventions: ADL retraining, Functional transfer training, Endurance training, Patient/family training  Subjective     Current Problem:  1. Other secondary osteoarthritis of right shoulder        2. Rotator cuff arthropathy of right shoulder        3. History of reverse total replacement of shoulder joint  acetaminophen (Tylenol) 325 mg tablet    docusate sodium (Colace) 100 mg capsule    oxyCODONE (Roxicodone) 5 mg immediate release tablet    tiZANidine (Zanaflex) 2 mg tablet    DISCONTINUED: tiZANidine (Zanaflex) 2 mg capsule            General:   OT Received On: 24  General  Reason for Referral: TOM LEON 24  Referred By: Dr. ANA LILIA Salas (PT/OT )  Past Medical History Relevant to Rehab: includes: HTN, HLD, depression, anxiety, A-fib, PUD, [place], PTSD, SOB, post-op delurium (per Pt.) L shoulder surgery  Family/Caregiver Present: No  Patient Position Received: Up in chair, Alarm off, not on at start of session (ultrasling  "on R UE, has KONSTANTIN drain still in place). Pt in medium ultrasling with poor fit (pt is 6'4\"), OT fit pt with large ultrasling, improved comfort reported by pt and improved R UE positioning noted.  General Comment: Pt. is an 83yo who presented to [facility] on 11/8/2024 for a scheduled R reverse TSA. Anticipate dc 11/10 due to increased output from KONSTANTIN drain    Precautions:  Hearing/Visual Limitations: [place], has hearing aids, not working properly  UE Weight Bearing Status: Right Non-Weight Bearing  Medical Precautions: Fall precautions  Post-Surgical Precautions:  (No AROM R shoulder; Sling R UE at all times except OK to remove with therapy; OK ROM elbow, wrist, hand, fingers; NWB R UE)           Pain:  Pain Assessment  Pain Assessment: 0-10  0-10 (Numeric) Pain Score: 2  Pain Type: Acute pain, Surgical pain  Pain Location: Shoulder  Pain Orientation: Right  Objective     Cognition:  Overall Cognitive Status: Within Functional Limits  Orientation Level: Oriented X4  Processing Speed: Delayed ([place])             Home Living:  Home Living Comments: Pt. lives with his spouse in a 1 level house [facility],  1 [address]. Walk in shower with a seat and grab bars.    Prior Function:  Hand Dominance: Right  Prior Function Comments: Pt. amb without AD except for a cane when shopping and a walking stick when on uneven terrain. Pt. was I with ADLs ad shared IADLs with spouse. Pt reports spouse was assisting with tremaine care due to limited IR. PTA. Pt. denied falls in last 3 months. Pt. drove PTA. Pt. is R handed.           Activities of Daily Living:   Eating Assistance: Independent  Grooming Assistance: Minimal  Bathing Assistance: Moderate  UE Dressing Assistance: Moderate  UE Dressing Deficit:  (doff gown, eh pullover shirt, doff/eh ultrasling)  LE Dressing Assistance: Minimal  LE Dressing Deficit: Thread RLE into pants, Thread LLE into pants, Thread RLE into underwear, Thread LLE into underwear  Toileting Assistance with Device: " Moderate  Toileting Deficit: Perineal hygiene, Clothing management up  Functional Assistance:  (pt ambulated with min A with SPC)                         Activity Tolerance:  Endurance:  (fair)           Bed Mobility/Transfers: Bed Mobility  Bed Mobility:  (NT, pt up in chair throughout eval)  Transfers  Transfer:  (sit to stand from recliner chair SBA)                Balance:  Static Sitting: good  Dynamic Sitting: good  Static Standing: fair +  Dynamic Standing: fair           Sensation:  Light Touch:  (R hand remains numb from surgical anesthesia block)    Strength:  Strength Comments: L UE 4/5 throughout, R NT due to recent shoulder surgery            Extremities: RUE   RUE :  (R elbow, wrist and hand AAROM WFL, nerve block still impacting full ROM) and LUE   LUE:  (L UE WFL except for limited IR, which impacts pts ability to complete tremaine care)    Outcome Measures: [facility] Daily Activity  Putting on and taking off regular lower body clothing: A lot  Bathing (including washing, rinsing, drying): A lot  Putting on and taking off regular upper body clothing: A lot  Toileting, which includes using toilet, bedpan or urinal: A little  Taking care of personal grooming such as brushing teeth: A lot  Eating Meals: None  Daily Activity - Total Score: 15    Education Documentation  Handouts, taught by Simi Hernandez OT at 11/9/2024 10:19 AM.  Learner: Patient  Readiness: Acceptance  Method: Explanation, Demonstration, Handout  Response: Needs Reinforcement    Precautions, taught by Simi Hernandez OT at 11/9/2024 10:19 AM.  Learner: Patient  Readiness: Acceptance  Method: Explanation, Demonstration, Handout  Response: Needs Reinforcement    Home Exercise Program, taught by Simi Hernandez OT at 11/9/2024 10:19 AM.  Learner: Patient  Readiness: Acceptance  Method: Explanation, Demonstration, Handout  Response: Needs Reinforcement    ADL Training, taught by Simi Hernandez OT at 11/9/2024 10:19 AM.  Learner:  Patient  Readiness: Acceptance  Method: Explanation, Demonstration, Handout  Response: Needs Reinforcement          EDUCATION:  Education  Education Comment: OT educated pt on R shoulder precautions of NWB , NO ROM R shoulder, instructed in sling doff/eh and UB dressing strategies, pt instructed in gentle AROM to R elbow, wrist and hand. Written handout provided, pt verbalized understanding    Goals:  Encounter Problems       Encounter Problems (Active)       OT Goals       Pt will doff/don sling with min A (Progressing)       Start:  11/09/24    Expected End:  11/16/24            Pt will complete gentle AROM of R elbow, wrist and hand indep (Progressing)       Start:  11/09/24    Expected End:  11/16/24            Pt will verbalize and adhere to shoulder precautions indep (Progressing)       Start:  11/09/24    Expected End:  11/16/24            Pt will transfer to bed, chair, toilet with modified indep (Progressing)       Start:  11/09/24    Expected End:  11/16/24

## 2024-11-09 NOTE — PROGRESS NOTES
Orthopedic Surgery Progress Note  Fabian Romero  11/9/2024    Subjective:     Post-Operative Day # 1   Status Post right Reverse Total Shoulder Arthroplasty     Systemic or Specific Complaints: No Complaints    Objective:     Visit Vitals  BP (!) 181/104 (BP Location: Left arm, Patient Position: Sitting)   Pulse 84   Temp 35.8 °C (96.4 °F) (Temporal)   Resp 18       Intake/Output Summary (Last 24 hours) at 11/9/2024 0911  Last data filed at 11/9/2024 0415  Gross per 24 hour   Intake 981.67 ml   Output 770 ml   Net 211.67 ml     DRAIN/TUBE OUTPUT:  Closed/Suction Drain 1 Right;Anterior Shoulder Bulb 10 Fr.-Output (mL): 70 mL      General: alert and oriented, in no acute distress, appears stated age, and cooperative   Wound: no erythema, no edema, no drainage, and dressing clean, dry, and intact. Osmani in place   Extremity: Sling on extremity.  Patient with slight numbness remaining to RUE. Mild edema present.    DVT Exam: No evidence of DVT seen on physical exam.  Negative Cezar's sign.  No significant calf/ankle edema.     Data Review  Labs reviewed:     CBC:   Lab Results   Component Value Date    WBC 13.3 (H) 11/09/2024    WBC 9.7 10/25/2024    HGB 11.4 (L) 11/09/2024    HGB 13.5 10/25/2024       BMP:    Lab Results   Component Value Date     11/09/2024    K 5.3 11/09/2024     11/09/2024    CO2 29 11/09/2024    BUN 27 (H) 11/09/2024    CREATININE 0.79 11/09/2024          I&O  I/O last 3 completed shifts:  In: 981.7 (9.3 mL/kg) [I.V.:881.7 (8.4 mL/kg); IV Piggyback:100]  Out: 770 (7.3 mL/kg) [Urine:700 (0.2 mL/kg/hr); Drains:70]  Dosing Weight: 105.4 kg       Assessment:     Status Post right Total Shoulder Arthroplasty, doing well postoperatively. No acute events overnight.    Osmani drain will stay in place today. Will remove when output is <20 ml in 8 hour shift.     Acute postoperative pain: Reports mild to moderate pain to operative extremity. Exacerbated by movement, relieved by rest ice and pain  medication. Continue current pain management.     Plan:      1.  Continue pain control, scripts sent  2.  PT/OT: Non weightbearing to operative shoulder with no ROM. Ok for ROM to elbow, hand, and wrist.   3.  Maintain KONSTANTIN today, record output. Continue Ance  4.  Anticipate discharge POD 2 pending drain removal  5.  Follow up with surgeon in office in 2 weeks    WESTLEY Lopez   11/9/2024 9:11 AM

## 2024-11-09 NOTE — CARE PLAN
Problem: Fall/Injury  Goal: Not fall by end of shift  11/9/2024 1408 by Pearl El RN  Outcome: Progressing  11/9/2024 1408 by Pearl El RN  Outcome: Progressing  Goal: Be free from injury by end of the shift  11/9/2024 1408 by Pearl El RN  Outcome: Progressing  11/9/2024 1408 by Pearl El RN  Outcome: Progressing  Goal: Verbalize understanding of personal risk factors for fall in the hospital  11/9/2024 1408 by Pearl El RN  Outcome: Progressing  11/9/2024 1408 by Pearl El RN  Outcome: Progressing  Goal: Verbalize understanding of risk factor reduction measures to prevent injury from fall in the home  11/9/2024 1408 by Pearl El RN  Outcome: Progressing  11/9/2024 1408 by Pearl El RN  Outcome: Progressing  Goal: Use assistive devices by end of the shift  11/9/2024 1408 by Pearl El RN  Outcome: Progressing  11/9/2024 1408 by Pearl El RN  Outcome: Progressing  Goal: Pace activities to prevent fatigue by end of the shift  11/9/2024 1408 by Pearl El RN  Outcome: Progressing  11/9/2024 1408 by Pearl El RN  Outcome: Progressing     Problem: Pain  Goal: Takes deep breaths with improved pain control throughout the shift  11/9/2024 1408 by Pearl El RN  Outcome: Progressing  11/9/2024 1408 by eParl El RN  Outcome: Progressing  Goal: Turns in bed with improved pain control throughout the shift  11/9/2024 1408 by Pearl El RN  Outcome: Progressing  11/9/2024 1408 by Pearl El RN  Outcome: Progressing  Goal: Walks with improved pain control throughout the shift  11/9/2024 1408 by Pearl El RN  Outcome: Progressing  11/9/2024 1408 by Pearl El RN  Outcome: Progressing  Goal: Performs ADL's with improved pain control throughout shift  11/9/2024 1408 by Pearl El RN  Outcome: Progressing  11/9/2024 1408 by Pearl El RN  Outcome: Progressing  Goal: Participates in PT with improved pain control throughout the shift  11/9/2024 1408 by Pearl El RN  Outcome:  Progressing  11/9/2024 1408 by Pearl El RN  Outcome: Progressing  Goal: Free from opioid side effects throughout the shift  11/9/2024 1408 by Pearl El RN  Outcome: Progressing  11/9/2024 1408 by Pearl El RN  Outcome: Progressing  Goal: Free from acute confusion related to pain meds throughout the shift  11/9/2024 1408 by Pearl El RN  Outcome: Progressing  11/9/2024 1408 by Pearl El RN  Outcome: Progressing     Problem: Pain - Adult  Goal: Verbalizes/displays adequate comfort level or baseline comfort level  11/9/2024 1408 by Pearl lE RN  Outcome: Progressing  11/9/2024 1408 by Pearl El RN  Outcome: Progressing     Problem: Safety - Adult  Goal: Free from fall injury  11/9/2024 1408 by Pearl El RN  Outcome: Progressing  11/9/2024 1408 by Pearl El RN  Outcome: Progressing     Problem: Chronic Conditions and Co-morbidities  Goal: Patient's chronic conditions and co-morbidity symptoms are monitored and maintained or improved  11/9/2024 1408 by Pearl El RN  Outcome: Progressing  11/9/2024 1408 by Pearl El RN  Outcome: Progressing

## 2024-11-10 VITALS
RESPIRATION RATE: 18 BRPM | HEART RATE: 78 BPM | BODY MASS INDEX: 28.3 KG/M2 | WEIGHT: 232.37 LBS | HEIGHT: 76 IN | SYSTOLIC BLOOD PRESSURE: 132 MMHG | OXYGEN SATURATION: 95 % | TEMPERATURE: 96.4 F | DIASTOLIC BLOOD PRESSURE: 61 MMHG

## 2024-11-10 PROCEDURE — 7100000011 HC EXTENDED STAY RECOVERY HOURLY - NURSING UNIT

## 2024-11-10 PROCEDURE — 2500000001 HC RX 250 WO HCPCS SELF ADMINISTERED DRUGS (ALT 637 FOR MEDICARE OP): Performed by: ORTHOPAEDIC SURGERY

## 2024-11-10 PROCEDURE — 2500000001 HC RX 250 WO HCPCS SELF ADMINISTERED DRUGS (ALT 637 FOR MEDICARE OP)

## 2024-11-10 PROCEDURE — 2500000004 HC RX 250 GENERAL PHARMACY W/ HCPCS (ALT 636 FOR OP/ED): Mod: JZ

## 2024-11-10 ASSESSMENT — COGNITIVE AND FUNCTIONAL STATUS - GENERAL
MOVING FROM LYING ON BACK TO SITTING ON SIDE OF FLAT BED WITH BEDRAILS: A LITTLE
DRESSING REGULAR LOWER BODY CLOTHING: A LITTLE
PERSONAL GROOMING: A LITTLE
DRESSING REGULAR UPPER BODY CLOTHING: A LITTLE
MOBILITY SCORE: 18
WALKING IN HOSPITAL ROOM: A LITTLE
EATING MEALS: A LITTLE
MOVING TO AND FROM BED TO CHAIR: A LITTLE
STANDING UP FROM CHAIR USING ARMS: A LITTLE
DAILY ACTIVITIY SCORE: 18
CLIMB 3 TO 5 STEPS WITH RAILING: A LITTLE
TOILETING: A LITTLE
TURNING FROM BACK TO SIDE WHILE IN FLAT BAD: A LITTLE
HELP NEEDED FOR BATHING: A LITTLE

## 2024-11-10 ASSESSMENT — PAIN SCALES - GENERAL
PAINLEVEL_OUTOF10: 0 - NO PAIN
PAINLEVEL_OUTOF10: 6

## 2024-11-10 ASSESSMENT — PAIN - FUNCTIONAL ASSESSMENT
PAIN_FUNCTIONAL_ASSESSMENT: 0-10
PAIN_FUNCTIONAL_ASSESSMENT: 0-10

## 2024-11-10 NOTE — CARE PLAN
The patient's goals for the shift include  rest and comfort    The clinical goals for the shift include pain management    Over the shift, the patient did make progress toward the following goals. Barriers to progression include none. Recommendations to address these barriers include none.

## 2024-11-10 NOTE — PROGRESS NOTES
Orthopedic Surgery Progress Note  Fabian Romero  11/10/2024    Subjective:     Post-Operative Day # 2 Status Post right TSA fracture    Systemic or Specific Complaints:No Complaints    Objective:     Visit Vitals  /61 (BP Location: Left arm, Patient Position: Sitting)   Pulse 78   Temp 35.8 °C (96.4 °F) (Temporal)   Resp 18       Intake/Output Summary (Last 24 hours) at 11/10/2024 1000  Last data filed at 11/10/2024 0900  Gross per 24 hour   Intake 300 ml   Output 1010 ml   Net -710 ml     DRAIN/TUBE OUTPUT:  Closed/Suction Drain 1 Right;Anterior Shoulder Bulb 10 Fr.-Output (mL): 10 mL      General: alert, appears stated age, and cooperative   Wound: wound clean and dry no evidence of infection   Extremity: Sling on extremity.  Distal NVI   DVT Exam: No evidence of DVT seen on physical exam.     Data Review    No results found for this or any previous visit (from the past 24 hours).  Assessment:     Status Post right TSA     Plan:      1:  Discharge today, Return to Clinic: 2w    2:  Continue Pain Control  3:  Physical therapy as per TSA protocol  4:  Narcotic prescription in chart  5:  Anticipate discharge today if pain well controlled  Drainage 10 to 15 cc plan is to discontinue the drain and discharge home today.    Augustine Allen MD

## 2024-11-11 ENCOUNTER — TELEPHONE (OUTPATIENT)
Dept: ORTHOPEDIC SURGERY | Facility: CLINIC | Age: 84
End: 2024-11-11
Payer: MEDICARE

## 2024-11-11 DIAGNOSIS — M25.511 ACUTE PAIN OF RIGHT SHOULDER: ICD-10-CM

## 2024-11-11 DIAGNOSIS — Z96.611 S/P REVERSE TOTAL SHOULDER ARTHROPLASTY, RIGHT: ICD-10-CM

## 2024-11-11 DIAGNOSIS — Z96.619 HISTORY OF REVERSE TOTAL REPLACEMENT OF SHOULDER JOINT: ICD-10-CM

## 2024-11-11 RX ORDER — OXYCODONE AND ACETAMINOPHEN 10; 325 MG/1; MG/1
1 TABLET ORAL EVERY 12 HOURS
Qty: 10 TABLET | Refills: 0 | Status: SHIPPED | OUTPATIENT
Start: 2024-11-11 | End: 2024-11-16

## 2024-11-11 NOTE — DISCHARGE SUMMARY
Discharge summary  This patient Fabian Romero was admitted to the hospital on 11/8/2024  after undergoing Procedure(s) (LRB):  Arthroplasty Reverse Shoulder (Right) without complications that morning.    During the postoperative period,while in hospital, patient was medically managed by the hospitalist. Please see medial notes and H&P for patients additional diagnoses.  Ortho agrees with all medical diagnoses and treatments while patient in hospital.  No significant or unexpected findings or abnormal ortho imaging were noted during the hospital stay    Hospital course      Patient tolerated surgical procedure well and there was no complications. Patient progressed adequately through their recovery during hospital stay including PT and rehabilitation.    Patient was then D/C on 11/10/2024 to home  in stable condition.  Patient was instructed on the use of pain medications, the signs and symptoms of infection, and was given our number to call should they have any questions or concerns following discharge.    Based on my clinical judgment, the patient was provided with a 7-day prescription for opioid medication at 30 MED, indicated for treatment of acute pain in the setting of recent surgery. OARRS report was run and has demonstrated an appropriate time course.  The patient has been provided with counseling pertaining to safe use of opioid medication.    Patient NWB to operative extremity. No active movement of operative shoulder. Patient to remain in sling at all times unless with therapy. Patient can come out of the sling for active range of motion of elbow, wrist, hand and fingers with support only.   Mepilex dressing to be removed POD#7 and incision left open to air  Follow up with surgeon in 2 weeks

## 2024-11-11 NOTE — TELEPHONE ENCOUNTER
Patient wife LVM stating that is upset about the pain medication that was given to her  after SX. Would like a call back.

## 2024-11-14 ENCOUNTER — HOSPITAL ENCOUNTER (EMERGENCY)
Facility: HOSPITAL | Age: 84
Discharge: HOME | End: 2024-11-14
Payer: MEDICARE

## 2024-11-14 ENCOUNTER — APPOINTMENT (OUTPATIENT)
Dept: RADIOLOGY | Facility: HOSPITAL | Age: 84
End: 2024-11-14
Payer: MEDICARE

## 2024-11-14 ENCOUNTER — OFFICE VISIT (OUTPATIENT)
Dept: ORTHOPEDIC SURGERY | Facility: CLINIC | Age: 84
End: 2024-11-14
Payer: MEDICARE

## 2024-11-14 VITALS
HEART RATE: 70 BPM | RESPIRATION RATE: 18 BRPM | DIASTOLIC BLOOD PRESSURE: 59 MMHG | HEIGHT: 76 IN | TEMPERATURE: 97.9 F | SYSTOLIC BLOOD PRESSURE: 128 MMHG | BODY MASS INDEX: 28.01 KG/M2 | OXYGEN SATURATION: 96 % | WEIGHT: 230 LBS

## 2024-11-14 DIAGNOSIS — I82.611 ACUTE CEPHALIC VEIN THROMBOSIS, RIGHT: Primary | ICD-10-CM

## 2024-11-14 DIAGNOSIS — Z96.611 STATUS POST REVERSE TOTAL REPLACEMENT OF RIGHT SHOULDER: ICD-10-CM

## 2024-11-14 DIAGNOSIS — Z96.619 HISTORY OF REVERSE TOTAL REPLACEMENT OF SHOULDER JOINT: Primary | ICD-10-CM

## 2024-11-14 PROCEDURE — 93971 EXTREMITY STUDY: CPT | Performed by: STUDENT IN AN ORGANIZED HEALTH CARE EDUCATION/TRAINING PROGRAM

## 2024-11-14 PROCEDURE — 99284 EMERGENCY DEPT VISIT MOD MDM: CPT | Mod: 25

## 2024-11-14 PROCEDURE — 93971 EXTREMITY STUDY: CPT

## 2024-11-14 PROCEDURE — 99211 OFF/OP EST MAY X REQ PHY/QHP: CPT | Performed by: PHYSICIAN ASSISTANT

## 2024-11-14 ASSESSMENT — PAIN SCALES - GENERAL: PAINLEVEL_OUTOF10: 0 - NO PAIN

## 2024-11-14 ASSESSMENT — COLUMBIA-SUICIDE SEVERITY RATING SCALE - C-SSRS
6. HAVE YOU EVER DONE ANYTHING, STARTED TO DO ANYTHING, OR PREPARED TO DO ANYTHING TO END YOUR LIFE?: NO
2. HAVE YOU ACTUALLY HAD ANY THOUGHTS OF KILLING YOURSELF?: NO
1. IN THE PAST MONTH, HAVE YOU WISHED YOU WERE DEAD OR WISHED YOU COULD GO TO SLEEP AND NOT WAKE UP?: NO

## 2024-11-14 ASSESSMENT — PAIN - FUNCTIONAL ASSESSMENT: PAIN_FUNCTIONAL_ASSESSMENT: 0-10
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
## 2024-11-14 NOTE — PROGRESS NOTES
History of present illness patient is 3 days status post right reverse total shoulder arthroplasty.  He did have a drain when he was in the hospital and had formed a hematoma.  He has had a fair amount of swelling in his arm and states that it woke him up at midnight so he went to the emergency room.  They did do a ultrasound which showed an occlusion of the superficial cephalic vein which will not require anticoagulant therapy.      Physical exam:      General: No acute distress or breathing difficulty or discomfort, pleasant and cooperative with the examination.    Extremities: Right arm examined today.  Incision is still intact.  He does have effusion throughout his right upper extremity with diffuse ecchymosis but is neurovascularly intact.  Can perform hand wrist and elbow range of motion without difficulty    Impression: Status post right reverse total shoulder arthroplasty    Plan: We downsized and readjusted the sling.  He is stanislav work on sitting without the sling on and performing gentle hand wrist and elbow range of motion to help with swelling reduction.  Still nonweightbearing right arm.  He will follow-up next week as scheduled with x-rays 2 views of the right shoulder and a wound check.

## 2024-11-20 ENCOUNTER — OFFICE VISIT (OUTPATIENT)
Dept: ORTHOPEDIC SURGERY | Facility: CLINIC | Age: 84
End: 2024-11-20
Payer: MEDICARE

## 2024-11-20 ENCOUNTER — HOSPITAL ENCOUNTER (OUTPATIENT)
Dept: RADIOLOGY | Facility: CLINIC | Age: 84
Discharge: HOME | End: 2024-11-20
Payer: MEDICARE

## 2024-11-20 DIAGNOSIS — M25.511 ACUTE PAIN OF RIGHT SHOULDER: ICD-10-CM

## 2024-11-20 DIAGNOSIS — Z96.619 HISTORY OF REVERSE TOTAL REPLACEMENT OF SHOULDER JOINT: ICD-10-CM

## 2024-11-20 DIAGNOSIS — M19.019 GLENOHUMERAL ARTHRITIS: ICD-10-CM

## 2024-11-20 PROCEDURE — 1157F ADVNC CARE PLAN IN RCRD: CPT | Performed by: PHYSICIAN ASSISTANT

## 2024-11-20 PROCEDURE — 99211 OFF/OP EST MAY X REQ PHY/QHP: CPT | Performed by: PHYSICIAN ASSISTANT

## 2024-11-20 PROCEDURE — 99024 POSTOP FOLLOW-UP VISIT: CPT | Performed by: PHYSICIAN ASSISTANT

## 2024-11-20 PROCEDURE — 73030 X-RAY EXAM OF SHOULDER: CPT | Mod: RT

## 2024-11-20 PROCEDURE — 73030 X-RAY EXAM OF SHOULDER: CPT | Mod: RIGHT SIDE | Performed by: ORTHOPAEDIC SURGERY

## 2024-11-20 RX ORDER — CYCLOBENZAPRINE HCL 10 MG
10 TABLET ORAL 3 TIMES DAILY PRN
Qty: 30 TABLET | Refills: 0 | Status: SHIPPED | OUTPATIENT
Start: 2024-11-20 | End: 2024-11-30

## 2024-11-20 NOTE — PROGRESS NOTES
History of present illness patient is status post right reverse total shoulder arthroplasty.  Main issue has been swelling soreness pain.  He presents today wearing his sling.      Physical exam:      General: No acute distress or breathing difficulty or discomfort, pleasant and cooperative with the examination.    Extremities: Patient has 2+ effusion throughout the right upper extremity.  Diffuse ecchymosis right upper extremity and chest.  No drainage or evidence of infection.  He can perform hand wrist and elbow range of motion.      Diagnostic studies: X-rays 2 views taken today of the right shoulder show well and position right reverse total shoulder arthroplasty with no fracture or dislocation this was read by Dr. Tim Salas    Impression: Status post right reverse total shoulder arthroplasty    Plan: Patient will continue to work on gentle hand wrist and elbow range of motion.  Just needs the sling when he is walking around.  He will follow-up in approximately 3 weeks for x-rays 2 views right shoulder and range of motion check.

## 2024-12-11 ENCOUNTER — OFFICE VISIT (OUTPATIENT)
Dept: ORTHOPEDIC SURGERY | Facility: CLINIC | Age: 84
End: 2024-12-11
Payer: MEDICARE

## 2024-12-11 ENCOUNTER — HOSPITAL ENCOUNTER (OUTPATIENT)
Dept: RADIOLOGY | Facility: CLINIC | Age: 84
Discharge: HOME | End: 2024-12-11
Payer: MEDICARE

## 2024-12-11 DIAGNOSIS — Z96.611 S/P REVERSE TOTAL SHOULDER ARTHROPLASTY, RIGHT: ICD-10-CM

## 2024-12-11 DIAGNOSIS — M25.511 RIGHT SHOULDER PAIN, UNSPECIFIED CHRONICITY: ICD-10-CM

## 2024-12-11 DIAGNOSIS — M19.019 GLENOHUMERAL ARTHRITIS: ICD-10-CM

## 2024-12-11 PROCEDURE — 73030 X-RAY EXAM OF SHOULDER: CPT | Mod: RT

## 2024-12-11 PROCEDURE — 99211 OFF/OP EST MAY X REQ PHY/QHP: CPT | Performed by: ORTHOPAEDIC SURGERY

## 2024-12-11 PROCEDURE — 1157F ADVNC CARE PLAN IN RCRD: CPT | Performed by: PHYSICIAN ASSISTANT

## 2024-12-11 PROCEDURE — 73030 X-RAY EXAM OF SHOULDER: CPT | Mod: RIGHT SIDE | Performed by: ORTHOPAEDIC SURGERY

## 2024-12-11 PROCEDURE — 99024 POSTOP FOLLOW-UP VISIT: CPT | Performed by: PHYSICIAN ASSISTANT

## 2024-12-11 RX ORDER — TIZANIDINE 2 MG/1
2 TABLET ORAL EVERY 6 HOURS PRN
Qty: 30 TABLET | Refills: 0 | Status: SHIPPED | OUTPATIENT
Start: 2024-12-11 | End: 2024-12-21

## 2024-12-11 RX ORDER — OXYCODONE AND ACETAMINOPHEN 5; 325 MG/1; MG/1
1 TABLET ORAL EVERY 6 HOURS PRN
Qty: 28 TABLET | Refills: 0 | Status: SHIPPED | OUTPATIENT
Start: 2024-12-11 | End: 2024-12-18

## 2024-12-11 NOTE — PROGRESS NOTES
History of present illness 1 month status post right reverse total shoulder arthroplasty.  Patient presents in sling.  He reports still having soreness and stiffness.      Physical exam:      General: No acute distress or breathing difficulty or discomfort, pleasant and cooperative with the examination.    Extremities: Right shoulder incisions clean dry and intact.  With hands together he can forward flex to 90 degrees      Diagnostic studies: X-rays 2 views right shoulder show well and will position right reverse total shoulder arthroplasty they are read by Dr. Tim Salas    Impression: Status post right reverse total shoulder arthroplasty    Plan: Patient will discontinue sling.  He will start continued supine stretches.  No weightbearing right arm.  Max lift 2 pounds.  He will follow-up in 1 month with x-rays 2 views of the right shoulder and range of motion check.

## 2025-01-13 ENCOUNTER — OFFICE VISIT (OUTPATIENT)
Dept: ORTHOPEDIC SURGERY | Facility: CLINIC | Age: 85
End: 2025-01-13
Payer: MEDICARE

## 2025-01-13 ENCOUNTER — HOSPITAL ENCOUNTER (OUTPATIENT)
Dept: RADIOLOGY | Facility: CLINIC | Age: 85
Discharge: HOME | End: 2025-01-13
Payer: MEDICARE

## 2025-01-13 DIAGNOSIS — M25.511 ACUTE PAIN OF RIGHT SHOULDER: ICD-10-CM

## 2025-01-13 DIAGNOSIS — Z96.611 S/P REVERSE TOTAL SHOULDER ARTHROPLASTY, RIGHT: Primary | ICD-10-CM

## 2025-01-13 DIAGNOSIS — M25.511 RIGHT SHOULDER PAIN, UNSPECIFIED CHRONICITY: ICD-10-CM

## 2025-01-13 PROCEDURE — 99024 POSTOP FOLLOW-UP VISIT: CPT | Performed by: PHYSICIAN ASSISTANT

## 2025-01-13 PROCEDURE — 1157F ADVNC CARE PLAN IN RCRD: CPT | Performed by: PHYSICIAN ASSISTANT

## 2025-01-13 PROCEDURE — 73030 X-RAY EXAM OF SHOULDER: CPT | Mod: RIGHT SIDE | Performed by: ORTHOPAEDIC SURGERY

## 2025-01-13 PROCEDURE — 99211 OFF/OP EST MAY X REQ PHY/QHP: CPT | Performed by: ORTHOPAEDIC SURGERY

## 2025-01-13 PROCEDURE — 73030 X-RAY EXAM OF SHOULDER: CPT | Mod: RT

## 2025-01-13 NOTE — PROGRESS NOTES
History of present illness 2-month status post right reverse total shoulder arthroplasty.  Patient just reports tightness and stiffness with soreness in certain motions.      Physical exam:      General: No acute distress or breathing difficulty or discomfort, pleasant and cooperative with the examination.    Extremities: Right shoulder incisions clean dry and intact.  Patient has forward flexion to 110 degrees abduction of 45 degrees external rotation of 30 degrees internal rotation posterior iliac crest      Diagnostic studies: X-rays 2 views right shoulder show well aligned with position right reverse total shoulder arthroplasty the read by Dr. Tim Salas    Impression: Status post right reverse total shoulder arthroplasty    Plan: Patient will now start gentle physical therapy program.  Still no weightbearing on right arm for 1 more month.  He will follow-up in 4 to 6 weeks for range of motion check.

## 2025-01-15 ENCOUNTER — EVALUATION (OUTPATIENT)
Dept: PHYSICAL THERAPY | Facility: CLINIC | Age: 85
End: 2025-01-15
Payer: MEDICARE

## 2025-01-15 DIAGNOSIS — M25.511 RIGHT SHOULDER PAIN, UNSPECIFIED CHRONICITY: ICD-10-CM

## 2025-01-15 DIAGNOSIS — Z96.611 S/P REVERSE TOTAL SHOULDER ARTHROPLASTY, RIGHT: ICD-10-CM

## 2025-01-15 PROCEDURE — 97162 PT EVAL MOD COMPLEX 30 MIN: CPT | Mod: GP | Performed by: PHYSICAL THERAPIST

## 2025-01-15 PROCEDURE — 97140 MANUAL THERAPY 1/> REGIONS: CPT | Mod: GP | Performed by: PHYSICAL THERAPIST

## 2025-01-15 ASSESSMENT — PATIENT HEALTH QUESTIONNAIRE - PHQ9
SUM OF ALL RESPONSES TO PHQ9 QUESTIONS 1 AND 2: 0
1. LITTLE INTEREST OR PLEASURE IN DOING THINGS: NOT AT ALL
2. FEELING DOWN, DEPRESSED OR HOPELESS: NOT AT ALL

## 2025-01-15 NOTE — PROGRESS NOTES
Physical Therapy Evaluation and Treatment      Patient Name: Fabian Romero  MRN: [id number]  Today's Date: 1/15/2025  Time Calculation  Start Time: 0108  Stop Time: 0152  Time Calculation (min): 44 min      PT Evaluation Time Entry  PT Evaluation (Moderate) Time Entry: 20  PT Therapeutic Procedures Time Entry  Manual Therapy Time Entry: 15  Therapeutic Exercise Time Entry: 7                   INSURANCE:  Visit number: 1/10  MEDICARE/ AARP 2410 ($0 USED) COPAY 0 (0)  COVERAGE 80/100 OOP 0 AARP TO FOLLOW MEDICARE  NO AUTH REQ [id number]     Surgery: R Reverse TSA on 11/8/2024 by Dr. Tim Salas  Hx: A-fib, HTN, OA, Ulcers, PTSD    ASSESSMENT:  PT Assessment Results: decreased knowledge of HEP, activity limitations, ADLs/IADLs/self care skills, flexibility, motor function/control/tone, pain, participation restrictions, range of motion/joint mobility, strength, posture, transfers, coordination, balance, fall risk, integumentary, decreased knowledge of precautions.  Rehab Prognosis: Good  Evaluation/Treatment Tolerance: Patient tolerated treatment well  Stable and/or uncomplicated characteristics    Fabian Romero is a 84 y.o. male presenting to the clinic s/p a R Reverse TSA on 11/8/2024 by Dr. Tim Salas. Pt demonstrates decreased ROM and strength of the R shoulder causing pain and dysfunction with reaching, lifting, pushing, pulling, carrying, dressing, and bathing. Pt was given and reviewed HEP including postural exercises. Pt will benefit from skilled PT in order to increase ROM and strength of the R shoulder so that the pt can perform ADLs without pain and return to PLOF.     PLAN:  Treatments/Interventions: traction, dry needling, edema control, education/instruction, home program, self care/home management, manual therapy, neuromuscular re-education, therapeutic activities, therapeutic exercises, modalities, therapeutic elastic taping.   PT Plan: Skilled PT  PT Frequency: 2 times per week  Duration:  10 visits  Onset Date: 10/01/24  Certification Period Start Date: 01/15/25  Certification Period End Date: 04/15/25  Rehab Potential: Good  Plan of Care Agreement: Patient    Goals -    By discharge pt will achieve the following goals:   Pt will report less than a 2/10 pain at the worst.  Pt will report a significant improvement with Quick DASH score.  Pt will have at least 4+/5 strength for the right shoulder.  Pt will have AROM to WFL for the right shoulder.  Pt will be independent with HEP.      CURRENT PROBLEM:   1. S/P reverse total shoulder arthroplasty, right  Referral to Physical Therapy      2. Right shoulder pain, unspecified chronicity  Referral to Physical Therapy    Follow Up In Physical Therapy          Subjective    General -    Pt had a R Reverse TSA on 11/8/2024 by Dr. Tim Salas.   Pt had pain in the R shoulder for years, but in October he went to go change his T-shirt and could not use very well after that.  Pt did have a previous L shoulder surgery but cannot remember if it was a RTC or total shoulder replacement.    Mechanism of Injury -    Insidious Onset    History of Current Episode - 10/1/24    Pain -    Pain Location: R shoulder  Pain Best: 0/10  Pain Today: 0/10 rest, motion 5/10  Pain Worst: 7/10   Pain Type: Intermittent, Achy/Dull, sometimes Sharp, Stiffness/Tightness, Numbness/Tingling in arm    Pain Exacerbating Factors: reaching, lifting, pushing, pulling, carrying, dressing, and bathing.  Pain Relieving Factors: Rest,   Difficulty Sleeping: Yes    Home Living -    Pt lives with his wife.    PRECAUTIONS -    Reverse TSA Protocol  Sx date 11/8/24    Prior Level of Function -    I with ADLs/IADLs     Objective     Shoulder AROM -  R shoulder Flexion: 35, 108 AAROM  R shoulder Abduction: 40  R shoulder ER: 5    Shoulder PROM -  R shoulder Flexion: 105 with empty end feel   R shoulder Abduction: 100 with empty end feel   R shoulder ER: 55 with empty end feel     Shoulder MMT (/5)  -  DNT due to surgery/precautions    Palpation -  TTP biceps, UT/levator, pectorals    Posture -   Elevated Scapula  Rounded Shoulders  Forward Head  Increased Thoracic Kyphosis     Outcome Measures -   Other Measures  Disability of Arm Shoulder Hand (DASH): 66 (% Quick)     Treatment -   Evaluation -   Moderate (74855)  Therapeutic Exercise (40927) -     Seated Correct Posture: reviewed  Scapular Retraction: x10  UT/Levator Scapulae Stretch: reviewed  Towel Slides at Table (Flexion/Scaption): x10 ea  Cane CP/Flex/ER AAROM: x10 ea  Manual Therapy (28499) -    PROM to L shoulder  STM to biceps/pectorals    OP Patient Education -   Access Code: [email or web address]  URL: [email or web address]  Date: 01/15/2025  Prepared by: Manjula Lugo    Exercises  - Seated Correct Posture   - Seated Scapular Retraction  - 1-2 x daily - 2 sets - 10 reps - 2 seconds hold  - Seated Upper Trapezius Stretch  - 1-2 x daily - 3 sets - 30 seconds hold  - Gentle Levator Scapulae Stretch  - 1-2 x daily - 3 sets - 30 seconds hold  - Seated Bilateral Shoulder Flexion Towel Slide at Table Top  - 1-2 x daily - 2-3 sets - 10 reps  - Seated Shoulder Towel Slides Scaption at Table  - 1-2 x daily - 2-3 sets - 10 reps  - Supine Shoulder Press with Dowel  - 1-2 x daily - 2-3 sets - 10 reps  - Supine Shoulder Flexion AAROM with Dowel  - 1-2 x daily - 2-3 sets - 10 reps  - Supine Shoulder External Rotation with Dowel  - 1-2 x daily - 2-3 sets - 10 reps

## 2025-01-17 ENCOUNTER — TREATMENT (OUTPATIENT)
Dept: PHYSICAL THERAPY | Facility: CLINIC | Age: 85
End: 2025-01-17
Payer: MEDICARE

## 2025-01-17 DIAGNOSIS — M25.511 RIGHT SHOULDER PAIN, UNSPECIFIED CHRONICITY: ICD-10-CM

## 2025-01-17 PROCEDURE — 97110 THERAPEUTIC EXERCISES: CPT | Mod: GP,CQ

## 2025-01-17 NOTE — PROGRESS NOTES
Physical Therapy Treatment    Patient Name: Fabian Romero  MRN: [id number]  Today's Date: 1/17/2025  Time Calculation  Start Time: 1051  Stop Time: 1115  Time Calculation (min): 24 min  PT Therapeutic Procedures Time Entry  Therapeutic Exercise Time Entry: 23       Current Problem  1. Right shoulder pain, unspecified chronicity  Follow Up In Physical Therapy          Subjective   General   Pt stating quite a bit of pain with movement still.   Insurance   Visit number: 2/10  MEDICARE/ AARP 2410 ($0 USED) COPAY 0 (0)  COVERAGE 80/100 OOP 0 AARP TO FOLLOW MEDICARE  NO AUTH REQ [id number]   Precautions     Pain       Objective   Treatments:  Table slides flex/scap   Supine wand CP/flex 2x10  Supine alphabet x1  Sidelying ER with towel under arm 2x10  Sidelying flexion 2x10  Sidelying abduction with elbow bent 2x10  Bent over h.abd to about 45 degrees 2x10    Assessment   Pt doing well overall. Motion well beyond functional at this point. Focused primarily on improving active control and strength. Some discomfort with use all listed ex's but denies sharp pains. Very limited GH and parascapular control with heavy UT compensation for almost all ex's.    Plan:  Cont to progress strength and endurance     OP EDUCATION:       Goals:

## 2025-01-27 NOTE — PROGRESS NOTES
"Physical Therapy Treatment    Patient Name: Fabian Romero  MRN: [id number]  Today's Date: 1/28/2025  Time Calculation  Start Time: 1132  Stop Time: 1216  Time Calculation (min): 44 min     PT Therapeutic Procedures Time Entry  Manual Therapy Time Entry: 10  Therapeutic Exercise Time Entry: 32                 Current Problem  1. Right shoulder pain, unspecified chronicity  Follow Up In Physical Therapy          Insurance:  Visit number: 3/10  MEDICARE/ AARP 2410 ($0 USED) COPAY 0 (0)  COVERAGE 80/100 OOP 0 AARP TO FOLLOW MEDICARE  NO AUTH REQ [id number]/ALL   Precautions   Reverse TSA Protocol  Sx date 11/8/24       Subjective   Subjective:   Pt says it still feels like he was run over by a truck. It is gradually getting better. If he moves it a certain way, it hurts, then he moves it back and it is okay. He does ex every other day.  Pain   4-5/10    Objective   131* AAROM flex  Treatments:  Table slides flex/scap  15x ea  Supine wand CP/flex 2x10  Supine alphabet x1  Supine rhythmic stab @ 90* 30\"x2   Sidelying ER with towel under arm 2x10  Sidelying flexion 2x10  Sidelying abduction with elbow bent 2x10  Bent over h.abd to about 45 degrees 2x10  Pulleys scaption 15x  Shoulder rows YTB 15x    PROM R shoulder 10'     OP EDUCATION:   Use R arm for reaching, with least amount of assist from the L       Assessment:   Pt guarded during passive flexion of shoulder. Better motion achieved with wand. Pt had difficulty not allowing the arm to drop during s/l flex, thus therapist assist given. Pt displayed more limited motion with s/l abduction. Did well with supine alphabet. Good movement with tableslides and pulleys, though a little UT compensation with pulleys. Introduced rows with cues to not allow the arms to extend past the body.    Plan:   Cont with gentle shoulder ROM              "

## 2025-01-28 ENCOUNTER — TREATMENT (OUTPATIENT)
Dept: PHYSICAL THERAPY | Facility: CLINIC | Age: 85
End: 2025-01-28
Payer: MEDICARE

## 2025-01-28 DIAGNOSIS — M25.511 RIGHT SHOULDER PAIN, UNSPECIFIED CHRONICITY: Primary | ICD-10-CM

## 2025-01-28 PROCEDURE — 97110 THERAPEUTIC EXERCISES: CPT | Mod: GP,CQ

## 2025-01-28 PROCEDURE — 97140 MANUAL THERAPY 1/> REGIONS: CPT | Mod: GP,CQ

## 2025-01-30 ENCOUNTER — TREATMENT (OUTPATIENT)
Dept: PHYSICAL THERAPY | Facility: CLINIC | Age: 85
End: 2025-01-30
Payer: MEDICARE

## 2025-01-30 DIAGNOSIS — M25.511 RIGHT SHOULDER PAIN, UNSPECIFIED CHRONICITY: Primary | ICD-10-CM

## 2025-01-30 PROCEDURE — 97140 MANUAL THERAPY 1/> REGIONS: CPT | Mod: GP,CQ

## 2025-01-30 PROCEDURE — 97110 THERAPEUTIC EXERCISES: CPT | Mod: GP,CQ

## 2025-02-04 ENCOUNTER — TREATMENT (OUTPATIENT)
Dept: PHYSICAL THERAPY | Facility: CLINIC | Age: 85
End: 2025-02-04
Payer: MEDICARE

## 2025-02-04 DIAGNOSIS — M25.511 RIGHT SHOULDER PAIN, UNSPECIFIED CHRONICITY: ICD-10-CM

## 2025-02-04 PROCEDURE — 97140 MANUAL THERAPY 1/> REGIONS: CPT | Mod: GP | Performed by: PHYSICAL THERAPIST

## 2025-02-04 PROCEDURE — 97110 THERAPEUTIC EXERCISES: CPT | Mod: GP | Performed by: PHYSICAL THERAPIST

## 2025-02-04 NOTE — PROGRESS NOTES
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 Tim Salas  Hx: A-fib, HTN, OA, Ulcers, PTSD    CURRENT PROBLEM:  1. Right shoulder pain, unspecified chronicity  Follow Up In Physical Therapy        SUBJECTIVE:   General -   Pt is doing home exercises.  Just some achy pain in the R shoulder when he goes to move it.  Pt reports that he is starting to notice more motion.    Precautions -    R Reverse TSA Protocol  Sx date 11/8/24    OBJECTIVE:     Shoulder PROM -  R shoulder Flexion: 138 with empty end feel   R shoulder Abduction: 110 with empty end feel     Treatment -   Therapeutic Exercise (93551) -  Supine wand CP/flex: 2x10 ea  Supine alphabet x1  Assisted Eccentric Flexion (Supine/Seated): x10 ea  Supine horz abd YTB x15  Side-lying ER with towel under arm 2x10  Side-lying flexion 2x10  Side-lying abduction with elbow bent x15  Shoulder rows RTB x20  Bicep curls (3-way): 2# (flex/hammer) 1# (reverse): x15 ea  Pulleys scaption --  Table slides flex/scap --  Supine rhythmic stab @ 90* --  Bent over h.abd to about 45 degrees --  Manual Therapy (21586) -    PROM R shoulder    ASSESSMENT:     PROM performed today with focus on flexion and abduction motions. Pt was introduced to eccentric lowering from flexion assisted by therapist, which was fatiguing for patient. Pt was able to progress to biceps 3-way this visit, which pt had more difficulty with reverse curls. Pt tolerated session well and is progressing towards functional needs. Continue to progress pt  within tolerance and POC.    PLAN:    Continue to progress pt within tolerance. Assess response to new exercises added.

## 2025-02-05 NOTE — PROGRESS NOTES
Physical Therapy Treatment    Patient Name: Fabian Romero  MRN: [id number]  Today's Date: 2/6/2025  Time Calculation  Start Time: 1135  Stop Time: 1217  Time Calculation (min): 42 min     PT Therapeutic Procedures Time Entry  Manual Therapy Time Entry: 10  Therapeutic Exercise Time Entry: 29                 Current Problem  1. Right shoulder pain, unspecified chronicity  Follow Up In Physical Therapy          Insurance:  Visit number: 6/10  MEDICARE/ AARP 2410 ($0 USED) COPAY 0 (0)  COVERAGE 80/100 OOP 0 AARP TO FOLLOW MEDICARE  NO AUTH REQ [id number]/ALL   Precautions   R Reverse TSA Protocol  Sx date 11/8/24    Hx: A-fib, HTN, OA, Ulcers, PTSD       Subjective   Subjective:   Pt reports that his shoulder is doing okay.  His L shoulder is feeling worse.   Pain   4/10    Objective   Shoulder AAROM -  R shoulder Flexion: 128  R shoulder Abduction: 110 with empty end feel      Treatments:  Therapeutic Exercise ([id number]) -  Supine wand CP/flex: 2x10 ea  Seated AAROM OH lift with elbow flexed 10x  Assisted Eccentric Flexion (Supine/Seated): x10 ea---  Supine horz abd YTB x16  Side-lying ER with towel under arm 2x10  Side-lying flexion 2x10  Side-lying abduction with elbow bent 2x10  Shoulder rows/ext RTB x20 (no extension past body_)  Bicep curls GTB 20x  Pulleys scaption --  Table slides flex/scap --  Supine rhythmic stab @ 90* --  Bent over h.abd to about 45 degrees --  Supine alphabet x1---  Manual Therapy ([id number]) -    PROM R shoulder 10'  OP EDUCATION:     Access Code: [email or web address]  URL: [email or web address]  Date: 02/06/2025  Prepared by: Octavia Hallman    Exercises  - Seated AAROM Shoulder Flexion  - 1 x daily - 7 x weekly - 2 sets - 10 reps    Assessment:   Pt guarded during PROM. Pt had some difficulty with wand exercises, due to pain in the L shoulder. Some assist needed for s/l ex to keep arm in correct planes. Pt limited with s/l abd, as only able to reach about 45*. Tried seated AA shoulder  elevation. This was more painful for the L vs R side. Did well with TB rows/ext.     Plan:   Cont with L shoulder strengthening               Alert and oriented, no focal deficits, no motor or sensory deficits.

## 2025-02-06 ENCOUNTER — TREATMENT (OUTPATIENT)
Dept: PHYSICAL THERAPY | Facility: CLINIC | Age: 85
End: 2025-02-06
Payer: MEDICARE

## 2025-02-06 DIAGNOSIS — M25.511 RIGHT SHOULDER PAIN, UNSPECIFIED CHRONICITY: Primary | ICD-10-CM

## 2025-02-06 PROCEDURE — 97140 MANUAL THERAPY 1/> REGIONS: CPT | Mod: GP,CQ

## 2025-02-06 PROCEDURE — 97110 THERAPEUTIC EXERCISES: CPT | Mod: GP,CQ

## 2025-02-11 ENCOUNTER — TREATMENT (OUTPATIENT)
Dept: PHYSICAL THERAPY | Facility: CLINIC | Age: 85
End: 2025-02-11
Payer: MEDICARE

## 2025-02-11 DIAGNOSIS — M25.511 RIGHT SHOULDER PAIN, UNSPECIFIED CHRONICITY: ICD-10-CM

## 2025-02-11 PROCEDURE — 97140 MANUAL THERAPY 1/> REGIONS: CPT | Mod: GP | Performed by: PHYSICAL THERAPIST

## 2025-02-11 PROCEDURE — 97110 THERAPEUTIC EXERCISES: CPT | Mod: GP | Performed by: PHYSICAL THERAPIST

## 2025-02-11 NOTE — PROGRESS NOTES
Physical Therapy Treatment    Patient Name: Fabian Romero  MRN: [id number]  Today's Date: 2/11/2025  Time Calculation  Start Time: 1040  Stop Time: 1118  Time Calculation (min): 38 min       PT Therapeutic Procedures Time Entry  Manual Therapy Time Entry: 10  Therapeutic Exercise Time Entry: 28                   INSURANCE:  Visit number: 7/10  MEDICARE/ AARP 2410 ($0 USED) COPAY 0 (0)  COVERAGE 80/100 OOP 0 AARP TO FOLLOW MEDICARE  NO AUTH REQ [id number]/ALL     CURRENT PROBLEM:  1. Right shoulder pain, unspecified chronicity  Follow Up In Physical Therapy        SUBJECTIVE:   General -   Pt is doing home exercises.  Good today just stiff  Reached for fridge sharp pain Sunday    Pain -    Pain Location/Description: R shoulder  Pain Today: 4/10  Pain Worst: 7/10     Precautions -    R Reverse TSA Protocol  Sx date 11/8/24     Hx: A-fib, HTN, OA, Ulcers, PTSD    OBJECTIVE:     Treatment -   Therapeutic Exercise (93734) -  Seated AAROM OH lift with elbow flexed 10x  Supine wand CP/flex: x15 ea  Assisted Eccentric Flexion (Supine/Seated): x10 ea supine only  Supine horz abd RTB x20  Side-lying ER with towel under arm x15  Side-lying flexion x15  Side-lying abduction with elbow bent x10  Shoulder rows/ext RTB x20 (no extension past body)  Seated Shoulder ER with elbow on table (60 deg): 2x10  Bicep curls GTB --  Pulleys scaption --  Table slides flex/scap --  Supine rhythmic stab @ 90* --  Bent over h.abd to about 45 degrees --  Supine alphabet x1---  Manual Therapy ([id number]) -    PROM R shoulder 10'    ASSESSMENT:     PROM performed with STM for improved R shoulder ROM. Continued with AAROM exercises and eccentric exercises assisted by therapist. Pt was introduced to a seated ER exercises for improved active motion strength. Pt tolerated session well and is progressing towards functional needs. Continue to progress pt within tolerance and POC.    PLAN:    Continue to progress pt within tolerance. Continue to slowly  progress strength.

## 2025-02-12 NOTE — PROGRESS NOTES
"Physical Therapy Treatment    Patient Name: Fabian Romero  MRN: [id number]  Today's Date: 2/13/2025  Time Calculation  Start Time: 1136  Stop Time: 1215  Time Calculation (min): 39 min     PT Therapeutic Procedures Time Entry  Manual Therapy Time Entry: 10  Therapeutic Exercise Time Entry: 28                 Current Problem  1. Right shoulder pain, unspecified chronicity  Follow Up In Physical Therapy          Insurance:  Visit number: 8/10  MEDICARE/ AARP 2410 ($0 USED) COPAY 0 (0)  COVERAGE 80/100 OOP 0 AARP TO FOLLOW MEDICARE  NO AUTH REQ [id number]/ALL   Precautions   R Reverse TSA Protocol  Sx date 11/8/24     Hx: A-fib, HTN, OA, Ulcers, PTSD    Subjective   Subjective:   Pt reports that his R shoulder is mad. He has difficulty with hand clasping exercise. His R shoulder stiffens up.  His L shoulder cont to hurt.   Pain   0/10    Objective   Treatments:  Therapeutic Exercise (06430) -  Seated AAROM OH lift with elbow flexed 10x  Supine active flexion 10x  Supine wand CP/flex: x15 ea---  Assisted Eccentric Flexion (Supine/Seated): x10 ea supine only  Supine horz abd RTB x20  Side-lying ER with towel under arm x15  Side-lying flexion x15  Side-lying abduction with elbow bent x10  S/l shoulder depression 15x3\"  Shoulder rows/ext RTB x20 (no extension past body)  Seated Shoulder ER with elbow on table (60 deg): 2x10---  Bicep curls GTB --  Pulleys scaption --  Table slides flex/scap --  Supine rhythmic stab @ 90* --  Bent over h.abd to about 45 degrees --  Supine alphabet x1---  Manual Therapy ([id number]) -    PROM R shoulder 10'  OP EDUCATION:   Avoiding UT compensation      Assessment:   Pt challenged with active flexion. Cues given to not allow the elbow to bend much. Pt started off with slow control with eccentric flexion, however, as reps went on, lost control as he fatigued. Tactile cues given with s/l exercises. Pt had difficulty with flex and abd. Better ability with ER. Introduced scap depression in s/l to " aid in more scap stability as pt tries to actively raise the arm. Fair tolerance to therapy session.    Plan:   Cont with gentle strengthening of the R shoulder.

## 2025-02-13 ENCOUNTER — TREATMENT (OUTPATIENT)
Dept: PHYSICAL THERAPY | Facility: CLINIC | Age: 85
End: 2025-02-13
Payer: MEDICARE

## 2025-02-13 DIAGNOSIS — M25.511 RIGHT SHOULDER PAIN, UNSPECIFIED CHRONICITY: Primary | ICD-10-CM

## 2025-02-13 PROCEDURE — 97140 MANUAL THERAPY 1/> REGIONS: CPT | Mod: GP,CQ

## 2025-02-13 PROCEDURE — 97110 THERAPEUTIC EXERCISES: CPT | Mod: GP,CQ

## 2025-02-18 ENCOUNTER — TREATMENT (OUTPATIENT)
Dept: PHYSICAL THERAPY | Facility: CLINIC | Age: 85
End: 2025-02-18
Payer: MEDICARE

## 2025-02-18 DIAGNOSIS — M25.511 RIGHT SHOULDER PAIN, UNSPECIFIED CHRONICITY: ICD-10-CM

## 2025-02-18 PROCEDURE — 97110 THERAPEUTIC EXERCISES: CPT | Mod: GP,CQ

## 2025-02-18 ASSESSMENT — PAIN SCALES - GENERAL: PAINLEVEL_OUTOF10: 0 - NO PAIN

## 2025-02-18 ASSESSMENT — PAIN - FUNCTIONAL ASSESSMENT: PAIN_FUNCTIONAL_ASSESSMENT: 0-10

## 2025-02-18 NOTE — PROGRESS NOTES
"Physical Therapy Treatment    Patient Name: Fabian Romero  MRN: [id number]  Today's Date: 2/18/2025  Time in 1105  Time [id number]  Treatment Time 40 min  Therapeutic Exercise 40 min                          Current Problem  1. Right shoulder pain, unspecified chronicity  Follow Up In Physical Therapy          Insurance:  Visit number: 9/10  MEDICARE/ AARP 2410 ($0 USED) COPAY 0 (0)  COVERAGE 80/100 OOP 0 AARP TO FOLLOW MEDICARE  NO AUTH REQ [id number]/ALL   Precautions   R Reverse TSA Protocol  Sx date 11/8/24     Hx: A-fib, HTN, OA, Ulcers, PTSD    Subjective   Subjective:  Reports his shoulder hurts if he moves it around. Reports the exercises make his arm tired.  Pain  Pain Assessment: 0-10  0-10 (Numeric) Pain Score: 0 - No pain    Objective   Treatments:  Therapeutic Exercise (36582) -    Supine active flexion 10x  Supine wand CP/flex: x15 ea  Assisted Eccentric Flexion (Supine/Seated): x10 ea supine only  Supine horz abd RTB x20  Side-lying ER with towel under arm x15  Side-lying flexion x15  Side-lying abduction with elbow bent x10  S/l shoulder depression 15x3\" --  Shoulder rows/ext RTB x20 (no extension past body)  Seated AAROM OH lift with elbow flexed 10x  Seated Shoulder ER with elbow on table (60 deg): 2x10  Bicep curls GTB --  Pulleys scaption x 10  Table slides flex/scap --  Supine rhythmic stab @ 90* --  Bent over h.abd to about 45 degrees 10  Supine alphabet x1  Manual Therapy (05239) -    PROM R shoulder 10'  OP EDUCATION:   Avoiding UT compensation      Assessment:  Continues to be challenged by all shoulder flexion activities. Fatigues with shoulder flexion activities, limited tolerance to eccentric supine strengthening. Cues to avoid UT substitutions with activities. Continues to demo weakness with all shoulder movements and will continue to benefit from focus on strength and endurance to improve functional use of UE    Plan:  Continue with shoulder strengthening and postural " correction.

## 2025-02-21 ENCOUNTER — TREATMENT (OUTPATIENT)
Dept: PHYSICAL THERAPY | Facility: CLINIC | Age: 85
End: 2025-02-21
Payer: MEDICARE

## 2025-02-21 DIAGNOSIS — M25.511 RIGHT SHOULDER PAIN, UNSPECIFIED CHRONICITY: ICD-10-CM

## 2025-02-21 PROCEDURE — 97110 THERAPEUTIC EXERCISES: CPT | Mod: GP | Performed by: PHYSICAL THERAPIST

## 2025-02-21 PROCEDURE — 97140 MANUAL THERAPY 1/> REGIONS: CPT | Mod: GP | Performed by: PHYSICAL THERAPIST

## 2025-02-21 NOTE — PROGRESS NOTES
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 Tim Salas  Hx: A-fib, HTN, OA, Ulcers, PTSD    CURRENT PROBLEM:  1. Right shoulder pain, unspecified chronicity  Follow Up In Physical Therapy        SUBJECTIVE:   General -   Pt is doing home exercises.  Still trouble with reaching/strength  Difficulty with getting on seatbelt    Pain -    Pain Location/Description: R shoulder  Pain Best: 0/10  Pain Today: 5/10  Pain Worst: 5/10     Precautions -    R Reverse TSA Protocol, Sx date 11/8/24  Hx: A-fib, HTN, OA, Ulcers, PTSD    OBJECTIVE:     Shoulder AROM -  R shoulder Flexion: 60  R shoulder Abduction: 50  R shoulder ER: 10     Shoulder PROM -  R shoulder Flexion: 130 with empty end feel   R shoulder Abduction: 120 with empty end feel   R shoulder ER: 73 with empty end feel     Shoulder MMT (/5) -  DNT due to surgery/precautions     Palpation -  TTP biceps, UT/levator, pectorals     Posture -   Elevated Scapula  Rounded Shoulders  Forward Head  Increased Thoracic Kyphosis     Outcome Measures -   Other Measures  Disability of Arm Shoulder Hand (DASH): 57 (% Quick)    Treatment -   Therapeutic Exercise (90534) -  Assessment  Supine wand CP/flex: x15 ea   Supine active flexion 10x   Supine ER/horz abd RTB x20   Side-lying ER with towel under arm 2x10  Side-lying flexion 2x10  Side-lying abduction 2x10  Seated Shoulder ER with elbow on table (60 deg): 3x10   Bent over h.abd to about 45 degrees 2x10  Shoulder rows/ext RTB x20 (no extension past body)   Manual  Therapy ([id number]) -    PROM R shoulder: 8 min    ASSESSMENT:     Pt is progressing towards his goals and demonstrates improvements with both ROM and strength R shoulder at this time. Pt reports an increased ability to perform ADLs, but still exhibits deficits as shown by his Quick DASH score. Pt will benefit from continued skilled PT in order to further improve ROM and strength of the R shoulder so that the pt can perform ADLs without pain and return to PLOF.    PLAN:  Treatments/Interventions: traction, dry needling, edema control, education/instruction, home program, self care/home management, manual therapy, neuromuscular re-education, therapeutic activities, therapeutic exercises, modalities, therapeutic elastic taping.   PT Plan: Skilled PT  PT Frequency: 2 times per week  Duration: 10 visits  Rehab Potential: Good  Plan of Care Agreement: Patient    Goals -    By discharge pt will achieve the following goals:   Pt will report less than a 2/10 pain at the worst. (Goal PROGRESSING)    Pt will report a significant improvement with Quick DASH score. (Goal PROGRESSING)    Pt will have at least 4+/5 strength for the right shoulder. (Goal PROGRESSING)    Pt will have AROM to WFL for the right shoulder. (Goal PROGRESSING)    Pt will be independent with HEP. (Goal PROGRESSING)

## 2025-02-27 ENCOUNTER — OFFICE VISIT (OUTPATIENT)
Dept: ORTHOPEDIC SURGERY | Facility: CLINIC | Age: 85
End: 2025-02-27
Payer: MEDICARE

## 2025-02-27 DIAGNOSIS — Z96.611 S/P REVERSE TOTAL SHOULDER ARTHROPLASTY, RIGHT: ICD-10-CM

## 2025-02-27 DIAGNOSIS — R21 RASH IN ADULT: Primary | ICD-10-CM

## 2025-02-27 PROCEDURE — 99213 OFFICE O/P EST LOW 20 MIN: CPT | Performed by: PHYSICIAN ASSISTANT

## 2025-02-27 PROCEDURE — 1157F ADVNC CARE PLAN IN RCRD: CPT | Performed by: PHYSICIAN ASSISTANT

## 2025-02-27 PROCEDURE — 99211 OFF/OP EST MAY X REQ PHY/QHP: CPT | Performed by: ORTHOPAEDIC SURGERY

## 2025-02-27 RX ORDER — PREDNISONE 10 MG/1
TABLET ORAL
Qty: 20 TABLET | Refills: 0 | Status: SHIPPED | OUTPATIENT
Start: 2025-02-27

## 2025-02-27 NOTE — PROGRESS NOTES
History of present illness patient is status post right reverse total shoulder arthroplasty.  He is currently in physical therapy as he has difficulty with progressive range of motion.  No complaints of pain but he does have weakness in the right arm.      Physical exam:      General: No acute distress or breathing difficulty or discomfort, pleasant and cooperative with the examination.    Extremities: Right shoulder incisions well-healed and intact no noticeable effusion patient has forward flexion of 60 degrees abduction of 50 degrees external rotation of 10 degrees      Diagnostic studies: X-rays 2 views show alignment position right total shoulder replacement the read by Dr. Tim Salas    Impression: Status post right reverse total shoulder arthroplasty    Plan: He will continue work with physical therapy as he needs to work on range of motion and some light conditioning.  We will follow-up in 6 weeks at that time we will get x-rays 2 views of right and left shoulder at that time.

## 2025-03-04 ENCOUNTER — APPOINTMENT (OUTPATIENT)
Dept: PHYSICAL THERAPY | Facility: CLINIC | Age: 85
End: 2025-03-04
Payer: MEDICARE

## 2025-03-06 ENCOUNTER — APPOINTMENT (OUTPATIENT)
Dept: PHYSICAL THERAPY | Facility: CLINIC | Age: 85
End: 2025-03-06
Payer: MEDICARE

## 2025-03-11 ENCOUNTER — APPOINTMENT (OUTPATIENT)
Dept: PHYSICAL THERAPY | Facility: CLINIC | Age: 85
End: 2025-03-11
Payer: MEDICARE

## 2025-03-11 DIAGNOSIS — M25.511 RIGHT SHOULDER PAIN, UNSPECIFIED CHRONICITY: Primary | ICD-10-CM

## 2025-03-13 ENCOUNTER — APPOINTMENT (OUTPATIENT)
Dept: PHYSICAL THERAPY | Facility: CLINIC | Age: 85
End: 2025-03-13
Payer: MEDICARE

## 2025-03-17 NOTE — PROGRESS NOTES
Physical Therapy Treatment    Patient Name: Fabian Romero  MRN: [id number]  Today's Date: 3/18/2025  Time Calculation  Start Time: 1135  Stop Time: 1215  Time Calculation (min): 40 min     PT Therapeutic Procedures Time Entry  Manual Therapy Time Entry: 10  Therapeutic Exercise Time Entry: 28                 Current Problem  1. Right shoulder pain, unspecified chronicity            Insurance:  Visit number: 11/20  MEDICARE/ AARP 2410 ($0 USED) COPAY 0 (0)  COVERAGE 80/100 OOP 0 AARP TO FOLLOW MEDICARE  NO AUTH REQ [id number]/ALL   Precautions   R Reverse TSA Protocol, Sx date 11/8/24  Hx: A-fib, HTN, OA, Ulcers, PTSD       Subjective   Subjective:   Pt reports that he had the flu last week, so didn't do too many exercises. He has tried to move it around. He has been using it to reach for things  Pain   4/10    Objective   Treatments:  Therapeutic Exercise (70618) -  Supine wand CP/flex: x15 ea   Supine rhythmic stab at 90* flex 45* x2  Supine active flexion 10x ---  Supine ER/horz abd RTB x20   Side-lying ER with towel under arm 2x10  Side-lying flexion 2x10  Side-lying abduction 2x10  Seated ER/IR isometric against therapist  Shoulder rows/ext RTB x20 (no extension past body)   Seated Shoulder ER with elbow on table (60 deg): 3x10 ----  Bent over h.abd to about 45 degrees 2x10-----    Manual Therapy (66043) -    PROM R shoulder: 10 min  OP EDUCATION:   Keeping upper arm against body during ER      Assessment:   Pt displayed good PROM of the shoulder, as not too tight at end ranges. However, weakness seen with supine wand exercises, as difficult to extend the elbow with chest presses. Tactile cues needed for s/l exercises, as pt had difficulty keeping the arm in correct alignment to body. ER/IR iso performed against therapist, to avoid compensations. Cues for standing posture during rows/ext. Normal fatigue following therapy sessionn.    Plan:   Cont to progress with gentle R shoulder strengthening

## 2025-03-18 ENCOUNTER — TREATMENT (OUTPATIENT)
Dept: PHYSICAL THERAPY | Facility: CLINIC | Age: 85
End: 2025-03-18
Payer: MEDICARE

## 2025-03-18 DIAGNOSIS — M25.511 RIGHT SHOULDER PAIN, UNSPECIFIED CHRONICITY: Primary | ICD-10-CM

## 2025-03-18 PROCEDURE — 97140 MANUAL THERAPY 1/> REGIONS: CPT | Mod: GP,CQ

## 2025-03-18 PROCEDURE — 97110 THERAPEUTIC EXERCISES: CPT | Mod: GP,CQ

## 2025-03-20 ENCOUNTER — APPOINTMENT (OUTPATIENT)
Dept: PHYSICAL THERAPY | Facility: CLINIC | Age: 85
End: 2025-03-20
Payer: MEDICARE

## 2025-03-20 DIAGNOSIS — M25.511 RIGHT SHOULDER PAIN, UNSPECIFIED CHRONICITY: Primary | ICD-10-CM

## 2025-03-24 NOTE — PROGRESS NOTES
"Physical Therapy Treatment    Patient Name: Fabian Romero  MRN: [id number]  Today's Date: 3/25/2025  Time Calculation  Start Time: 1135  Stop Time: 1215  Time Calculation (min): 40 min     PT Therapeutic Procedures Time Entry  Manual Therapy Time Entry: 12  Therapeutic Exercise Time Entry: 26                 Current Problem  1. Right shoulder pain, unspecified chronicity            Insurance:  Visit number: 12/20  MEDICARE/ AARP 2410 ($0 USED) COPAY 0 (0)  COVERAGE 80/100 OOP 0 AARP TO FOLLOW MEDICARE  NO AUTH REQ [id number]/ALL   Precautions    R Reverse TSA Protocol, Sx date 11/8/24  Hx: A-fib, HTN, OA, Ulcers, PTSD       Subjective   Subjective:   Pt states that he removed his bandage yesterday and it only had a little bit of blood on the bandage. Pt states that his shoulder is doing pretty good. It feels a little uncomortable and sore at times, but not painful. Pt able to get his coat on by himself,though it was a struggle. Pt also droved using B hands  Pain   0/10    Objective   Treatments:  Therapeutic Exercise (91394) -  Supine wand CP/flex: x15 ea ----  Supine rhythmic stab at 90* flex 45* x2---  Supine active flexion 10x ---  Supine ER/horz abd RTB x20  Side-lying ER with towel under arm 2x10  Side-lying flexion 2x10  Side-lying abduction 2x10  Seated ER/IR isometric against therapist 5\"x10ea  Seated wand chest press 5x3  Shoulder rows/ext GTB x20 (rows only today)  Seated Shoulder ER with elbow on table (60 deg): 3x10 ----  Bent over h.abd to about 45 degrees 2x10-----     Manual Therapy (24313) -    PROM R shoulder: 10 min  OP EDUCATION:   Keep using the R arm to reach for things      Assessment:   Pt had difficulty with s/l exercises. Needed tactile cues with flex and abd to keep the arm in correct alignment to the body. Pt challenged to keep the R arm at 90* of flexion for supine horz abd. Significant UT compensation displayed with seated wand chest press. Not a lot of pain felt during exercises, " more weakness, especially with ER    Plan:   Cont to increase R AROM and strength

## 2025-03-25 ENCOUNTER — TREATMENT (OUTPATIENT)
Dept: PHYSICAL THERAPY | Facility: CLINIC | Age: 85
End: 2025-03-25
Payer: MEDICARE

## 2025-03-25 DIAGNOSIS — M25.511 RIGHT SHOULDER PAIN, UNSPECIFIED CHRONICITY: Primary | ICD-10-CM

## 2025-03-25 PROCEDURE — 97110 THERAPEUTIC EXERCISES: CPT | Mod: GP,CQ

## 2025-03-25 PROCEDURE — 97140 MANUAL THERAPY 1/> REGIONS: CPT | Mod: GP,CQ

## 2025-03-27 ENCOUNTER — TREATMENT (OUTPATIENT)
Dept: PHYSICAL THERAPY | Facility: CLINIC | Age: 85
End: 2025-03-27
Payer: MEDICARE

## 2025-03-27 DIAGNOSIS — M25.511 RIGHT SHOULDER PAIN, UNSPECIFIED CHRONICITY: Primary | ICD-10-CM

## 2025-03-27 PROCEDURE — 97110 THERAPEUTIC EXERCISES: CPT | Mod: GP,CQ

## 2025-03-27 PROCEDURE — 97140 MANUAL THERAPY 1/> REGIONS: CPT | Mod: GP,CQ

## 2025-03-27 NOTE — PROGRESS NOTES
"Physical Therapy Treatment    Patient Name: Fabian Romero  MRN: [id number]  Today's Date: 3/27/2025  Time Calculation  Start Time: 1132  Stop Time: 1211  Time Calculation (min): 39 min     PT Therapeutic Procedures Time Entry  Manual Therapy Time Entry: 8  Therapeutic Exercise Time Entry: 31                 Insurance:   Visit number: 13/20  MEDICARE/ AARP 2410 ($0 USED) COPAY 0 (0)  COVERAGE 80/100 OOP 0 AARP TO FOLLOW MEDICARE  NO AUTH REQ [id number]/ALL   Precautions    R Reverse TSA Protocol, Sx date 11/8/24  Hx: A-fib, HTN, OA, Ulcers, PTSD  Assessment:   Pt challenged w/ Supine AROM Flex , robbie when initiating movement, due to weakness and limited ROM. Assistance w/ most AROM ex's to help decrease UT elevation and improve mobility. Pt demo's good understanding of ex's to perform w/ HEP. Tightness at end range motions, robbie Flexion however did improve w/ manual. Pt will cont to benefit from skilled PT to address his deficits and improve his overall functional ability.   Plan:    Cont to increase R AROM and strength. Re-check sched 4/4.   MD fu 4/10.     Current Problem  1. Right shoulder pain, unspecified chronicity            Subjective   General   \"It's sore. It's not painful though. I worked it hard yesterday.\"   Pt reports compliance w/ HEP.   Precautions   rTSA    Pain       Objective       Treatments:  Therapeutic Exercise (60791) -  Supine wand CP/flex: x20 ea   Supine rhythmic stab at 90* flex 45* x2---  Supine active flexion 10x   Supine ER/horz abd YTB 2x10 (seated today)   Side-lying ER with towel under arm 2x10  Side-lying flexion 2x10  Side-lying abduction 2x10  Seated ER/IR isometric against therapist 5\"x10ea  Seated wand chest press 5x3  Shoulder rows/ext GTB x20 (rows only today)  Seated Shoulder ER with elbow on table (60 deg): 3x10 ----  Bent over h.abd to about 45 degrees 2x10-----     Manual Therapy (74496) -    PROM R shoulder: 10 min    EDUCATION:         "

## 2025-04-01 ENCOUNTER — TREATMENT (OUTPATIENT)
Dept: PHYSICAL THERAPY | Facility: CLINIC | Age: 85
End: 2025-04-01
Payer: MEDICARE

## 2025-04-01 DIAGNOSIS — M25.511 RIGHT SHOULDER PAIN, UNSPECIFIED CHRONICITY: Primary | ICD-10-CM

## 2025-04-01 PROCEDURE — 97110 THERAPEUTIC EXERCISES: CPT | Mod: GP | Performed by: PHYSICAL THERAPIST

## 2025-04-01 NOTE — PROGRESS NOTES
"Physical Therapy Treatment    Patient Name: Fabian Romero  MRN: [id number]  Today's Date: 4/1/2025  Time Calculation  Start Time: 1113  Stop Time: 1156  Time Calculation (min): 43 min       PT Therapeutic Procedures Time Entry  Manual Therapy Time Entry: 3  Therapeutic Exercise Time Entry: 40                   INSURANCE:  Visit number: 14/20  MEDICARE/ AARP 2410 ($0 USED) COPAY 0 (0)  COVERAGE 80/100 OOP 0 [employer]ARP TO FOLLOW MEDICARE  NO AUTH REQ [id number]/ALL     Precautions -    R Reverse TSA Protocol, Sx date 11/8/24  Hx: A-fib, HTN, OA, Ulcers, PTSD    CURRENT PROBLEM:  1. Right shoulder pain, unspecified chronicity          SUBJECTIVE:   General -   Pt is doing home exercises.  Still weakness    Pain -    Pain Location/Description: R shoulder  Pain Today: 0/10    OBJECTIVE:     Treatment -   Therapeutic Exercise ([id number]) -  Supine Scapular retraction isometric: 5 sec x1 min  Supine Active Chest Press R: x5 assisted, x15   Supine Alphabet R: x1  Supine Active Flexion R: x15 assisted  Supine T-band B ER: RTB 2x10  Supine T-band B HzAB: RTB 2x10  Side-lying ER with towel, flexion, AB: 2x10 (assisted shoulder depression)   Seated Shoulder ER with elbow on table (60 deg): 3x10   Seated wand chest press: 5x3   T-band flexion/AB: YTB 2x10 ea  T-band ADD: GTB 2x10  T-band Rows/Ext: RTB x20 ea  Wall Push Up: x10 (assisted)  Seated ER/IR isometric against therapist 5\" --    OP Patient Education -   Continued education to reduce UT compensation    ASSESSMENT:     Pt was given V&T cues for reduced upper trap compensation throughout the clinic. Pt was able to improve supine exercises with only needing assistance for the first few for chest press motions and improving with ability to control eccentric lowering with flexion. Pt was introduced to standing t-band flexion/AB/ADD exercises this visit in order to improve muscle activation. Introduced a wall push up with assistance from therapist on the R side for improved " proprioception from closed chained exercise. Pt tolerated session well and is progressing towards functional needs. Continue to progress pt within tolerance and POC.    PLAN:    Continue to progress pt within tolerance. Continue to progress strengthening and postural awareness.

## 2025-04-04 ENCOUNTER — TREATMENT (OUTPATIENT)
Dept: PHYSICAL THERAPY | Facility: CLINIC | Age: 85
End: 2025-04-04
Payer: MEDICARE

## 2025-04-04 DIAGNOSIS — M25.511 RIGHT SHOULDER PAIN, UNSPECIFIED CHRONICITY: Primary | ICD-10-CM

## 2025-04-04 PROCEDURE — 97110 THERAPEUTIC EXERCISES: CPT | Mod: GP | Performed by: PHYSICAL THERAPIST

## 2025-04-04 NOTE — PROGRESS NOTES
"Physical Therapy Treatment    Patient Name: Fabian Romero  MRN: [id number]  Today's Date: 4/7/2025  Time Calculation  Start Time: 1126  Stop Time: 1205  Time Calculation (min): 39 min       PT Therapeutic Procedures Time Entry  Therapeutic Exercise Time Entry: 38                   INSURANCE:  Visit number: 15/20  MEDICARE/ AARP 2410 ($0 USED) COPAY 0 (0)  COVERAGE 80/100 OOP 0 [employer]ARP TO FOLLOW MEDICARE  NO AUTH REQ [id number]/ALL      Precautions -    R Reverse TSA Protocol, Sx date 11/8/24  Hx: A-fib, HTN, OA, Ulcers, PTSD    CURRENT PROBLEM:  1. Right shoulder pain, unspecified chronicity          SUBJECTIVE:   General -   Pt is doing home exercises.  Stiffness but no pain in the L shoulder  Ear infection    Pain -    Pain Location/Description: L shoulder  Pain Today: 1-2/10    Precautions -    R Reverse TSA Protocol, Sx date 11/8/24  Hx: A-fib, HTN, OA, Ulcers, PTSD    OBJECTIVE:     Treatment -   Therapeutic Exercise ([id number]) -  Supine Scapular retraction isometric: 5 sec x1 min  Supine Active Chest Press R: x15 assisted  Supine Alphabet R: x1  Supine Active Flexion R: x20 assisted  Supine Rhythmic Stabilization: 30 sec x2  Supine T-band B ER: RTB 2x10  Supine T-band B HzAB: RTB 2x10  Side-lying ER with towel 1# 2x10  Side-lying flexion, AB (assisted scapular depression): 2x10 ea  Seated Shoulder ER with elbow on table (60 deg): 1# x15  T-band flexion/AB: YTB 2x10 ea  T-band ADD: BTB 2x10  T-band Rows/Ext: GTB x20 ea  Wall Push Up: x10 (assisted)  Seated wand chest press: 5x3   Seated ER/IR isometric against therapist 5\" --    ASSESSMENT:     Pt was introduced to rhythmic stabilization, which pt had the most difficulty with the flexion direction. Pt is improving with eccentric control down with supine flexion, but does require assistance from therapist for full concentric motion. Pt tolerated session well and is progressing towards functional needs. Continue to progress pt within tolerance and POC.    PLAN:  "   Continue to progress pt within tolerance.

## 2025-04-10 ENCOUNTER — OFFICE VISIT (OUTPATIENT)
Dept: ORTHOPEDIC SURGERY | Facility: CLINIC | Age: 85
End: 2025-04-10
Payer: MEDICARE

## 2025-04-10 ENCOUNTER — HOSPITAL ENCOUNTER (OUTPATIENT)
Dept: RADIOLOGY | Facility: CLINIC | Age: 85
Discharge: HOME | End: 2025-04-10
Payer: MEDICARE

## 2025-04-10 DIAGNOSIS — Z96.611 S/P REVERSE TOTAL SHOULDER ARTHROPLASTY, RIGHT: ICD-10-CM

## 2025-04-10 PROCEDURE — 99213 OFFICE O/P EST LOW 20 MIN: CPT | Performed by: ORTHOPAEDIC SURGERY

## 2025-04-10 PROCEDURE — 1157F ADVNC CARE PLAN IN RCRD: CPT | Performed by: ORTHOPAEDIC SURGERY

## 2025-04-10 PROCEDURE — 73030 X-RAY EXAM OF SHOULDER: CPT | Mod: RT

## 2025-04-10 NOTE — PROGRESS NOTES
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    Extremities: Incision clean and dry ecchymosis bruising is resolved motor intact C5-T1    He can abduct to 50 but still has limited forward flexion up to around 60 but it is improving skin incision is healed no ecchymosis bruising swelling edema as stated no sign of infection no redness no warmth no pain with internal rotation obvious weakness with overhead activity and typical restrictions of reverse total shoulder    Diagnostic studies: X-rays show well-positioned reverse total shoulder replaced    Impression: Continue supine and upright strengthening continue doing therapy on his own and finish up his formal therapy program I would add a 2 pound weight to his supine strengthening program to strengthen the anterior deltoid    Plan: Follow-up 6 months with [facility] axillary x-ray right shoulder we will see how he improves over time    His left shoulder has an anatomic shoulder replacement but it still functions at a high level with some occasional pain and crepitus so I would not recommend converting it to a reverse at this time

## 2025-04-15 ENCOUNTER — TREATMENT (OUTPATIENT)
Dept: PHYSICAL THERAPY | Facility: CLINIC | Age: 85
End: 2025-04-15
Payer: MEDICARE

## 2025-04-15 DIAGNOSIS — M25.511 RIGHT SHOULDER PAIN, UNSPECIFIED CHRONICITY: Primary | ICD-10-CM

## 2025-04-15 PROCEDURE — 97110 THERAPEUTIC EXERCISES: CPT | Mod: GP,CQ

## 2025-04-15 ASSESSMENT — PAIN SCALES - GENERAL: PAINLEVEL_OUTOF10: 1

## 2025-04-15 ASSESSMENT — PAIN - FUNCTIONAL ASSESSMENT: PAIN_FUNCTIONAL_ASSESSMENT: 0-10

## 2025-04-15 NOTE — PROGRESS NOTES
"Physical Therapy Treatment    Patient Name: Fabian Romero  MRN: [id number]  Today's Date: 4/15/2025  Time Calculation  Start Time: 1015  Stop Time: 1055  Time Calculation (min): 40 min     PT Therapeutic Procedures Time Entry  Therapeutic Exercise Time Entry: 40                 Insurance:   Visit number: 16/20  MEDICARE/ [employer]ARP 2410 ($0 USED) COPAY 0 (0)  COVERAGE 80/100 OOP 0 [employer]ARP TO FOLLOW MEDICARE  NO AUTH REQ [id number]/ALL      Precautions -    R Reverse TSA Protocol, Sx date 11/8/24  Hx: A-fib, HTN, OA, Ulcers, PTSD  Assessment:   Pt cont's to be challenged w/ ROM and Strengthening ex's however did have better luly to this visit. Cues for postural awareness needed throughout entire session. Pt fatigues very quickly w/ tband flex and abd (yellow) performed today. Assistance needed for both concentric and eccentric control however overall it is improving. Pt has been using RUE more at home to help improve his functional ability. Pt did not report any increase in pain following today's session. He will cont to benefit from skilled PT to address his strength deficits.   Plan:   Continue to progress pt within tolerance and protocol.     Current Problem  1. Right shoulder pain, unspecified chronicity  Follow Up In Physical Therapy          Subjective   General   \"When I move it just right I get a little twinge.\" Pt states otherwise his shoulder is \"doing good\" coming in for appt today. Pt having more issues w/ his inner ear infection that won't go away and is causing him dizziness issues. \"I have a test today for the ears.\"   Precautions  Precautions  Precautions Comment: 4/10: 2# wt allowed for strengthening ex's (per RZ)     Pain  Pain Assessment: 0-10  0-10 (Numeric) Pain Score: 1    Objective       Treatments:  Therapeutic Exercise ([id number]):   Supine Scapular retraction isometric: 5 sec x 20  Supine Active Chest Press R: x15 assisted   Supine Alphabet R: x1  Supine Active Flexion R: x20 assisted  Supine Rhythmic " "Stabilization: 30 sec x2  Supine T-band B ER: RTB 2x10  Supine T-band B HzAB: RTB 2x10  Side-lying ER with towel 1# 2x10  Side-lying flexion, AB (assisted scapular depression): 2x10 ea  Seated Shoulder ER with elbow on table (60 deg): 1# x15  T-band flexion/AB: YTB x10 ea  T-band ADD: BTB 2x10  T-band Rows/Ext: GTB x20 ea  Wall Push Up: x10 (assisted)  Seated wand chest press: 5x3   Seated ER/IR isometric against therapist 5\" --     EDUCATION:         "

## 2025-04-22 ENCOUNTER — TREATMENT (OUTPATIENT)
Dept: PHYSICAL THERAPY | Facility: CLINIC | Age: 85
End: 2025-04-22
Payer: MEDICARE

## 2025-04-22 DIAGNOSIS — M25.511 RIGHT SHOULDER PAIN, UNSPECIFIED CHRONICITY: Primary | ICD-10-CM

## 2025-04-22 PROCEDURE — 97110 THERAPEUTIC EXERCISES: CPT | Mod: GP,CQ

## 2025-04-22 ASSESSMENT — PAIN SCALES - GENERAL: PAINLEVEL_OUTOF10: 0 - NO PAIN

## 2025-04-22 ASSESSMENT — PAIN - FUNCTIONAL ASSESSMENT: PAIN_FUNCTIONAL_ASSESSMENT: 0-10

## 2025-04-22 NOTE — PROGRESS NOTES
"Physical Therapy Treatment    Patient Name: Fabian Romero  MRN: [id number]  Today's Date: 4/22/2025  Time Calculation  Start Time: 1017  Stop Time: 1057  Time Calculation (min): 40 min     PT Therapeutic Procedures Time Entry  Therapeutic Exercise Time Entry: 40                 Insurance:   Visit number: 17/20  MEDICARE/ AARP 2410 ($0 USED) COPAY 0 (0)  COVERAGE 80/100 OOP 0 [employer]ARP TO FOLLOW MEDICARE  NO AUTH REQ [id number]/ALL      Assessment:   Pt able to complete 5 reps chest press unassisted and then needed some assistance from therapist to complete remaining 10 reps. He cont's to have most challenge w/ flexion, robbie w/ yellow tband, requiring assistance to complete. Pt is motivated to progress and is compliant w/ ex's at home. Encouraged cont'd emphasis on postural awareness. He will cont to benefit from skilled PT to address his deficits and improve his functional ability.   Plan:   Continue to progress pt within tolerance and protocol.        Current Problem  1. Right shoulder pain, unspecified chronicity            Subjective   General   \"It's stiff but no pain.\" Pt having a lot of ear issues and finally got in to an ENT and had a hearing test which showed significant hearing loss. \"Cochlear implants are the way to go but I have to wait for the insurance.\"   Pt compliant w/ HEP. \"I still have trouble going up though.\" (Flex)  Precautions    R Reverse TSA Protocol, Sx date 11/8/24  Hx: A-fib, HTN, OA, Ulcers, PTSD    Pain  Pain Assessment: 0-10  0-10 (Numeric) Pain Score: 0 - No pain    Objective       Treatments:  Therapeutic Exercise ([id number]):   Supine Scapular retraction isometric: 5 sec x 20  Supine Active Chest Press R: x5 unassisted, x10 assisted   Supine Alphabet R: x1  Supine Active Flexion R: x20 assisted  Supine Rhythmic Stabilization: 30 sec x2  Supine T-band B ER: RTB 2x10  Supine T-band B HzAB: RTB 2x10  Side-lying ER with towel 1# 2x10  Side-lying flexion, AB (assisted scapular depression): 2x10 " "ea  Seated Shoulder ER with elbow on table (60 deg): 1# x15   T-band flexion/AB: YTB x10 ea   T-band ADD: BTB 2x10  T-band Rows/Ext: GTB x20 ea  Wall Push Up: x10 (assisted) --  Seated wand chest press: 5x3   Seated ER/IR isometric against therapist 5\" --     EDUCATION:         "

## 2025-04-23 NOTE — PROGRESS NOTES
"Physical Therapy Treatment    Patient Name: Fabian Romero  MRN: [id number]  Today's Date: 4/24/2025  Time Calculation  Start Time: 1049  Stop Time: 1132  Time Calculation (min): 43 min     PT Therapeutic Procedures Time Entry  Therapeutic Exercise Time Entry: 39                 Current Problem  1. Right shoulder pain, unspecified chronicity            Insurance:  Visit number: 18/20  MEDICARE/ AARP 2410 ($0 USED) COPAY 0 (0)  COVERAGE 80/100 OOP 0 [employer]ARP TO FOLLOW MEDICARE  NO AUTH REQ [id number]/ALL      Precautions   R Reverse TSA Protocol, Sx date 11/8/24  Hx: A-fib, HTN, OA, Ulcers, PTSD       Subjective   Subjective:   Pt reports that his shoulder is tender. He did exercises during the [employer] game. It is sore now  Pain   0/10    Objective   Treatments:  Therapeutic Exercise ([id number]):   Supine Scapular retraction isometric: 5 sec x 20---  Supine Active Chest Press R: x5 unassisted, x10 assisted   Supine Alphabet R: x1----  Supine Active Flexion R: x20 assisted  Supine Rhythmic Stabilization: 30 sec x2---  Supine T-band B ER: RTB 2x10  Supine T-band B HzAB: RTB 2x10  Side-lying ER with towel  2x10  Side-lying flexion, AB (assisted scapular depression): 2x10 ea  T-band Rows/Ext: GTB x20 ea  Seated Shoulder ER with elbow on table (60 deg): 1# x15 ---  T-band flexion/AB: YTB x10 ea ---  T-band ADD: BTB 2x10---    Wall Push Up: x10 (assisted) --  Seated wand chest press: 5x3---  Seated ER/IR isometric against therapist 5\" --     OP EDUCATION:   Correct arm position with exercises      Assessment:   Pt displays a lot of weakness in the L shoulder.Had difficulty maintaining the shoulder in 90* of flexion during supine chest press. Needed assist to keep the arm in sagital plane. Reminders with ER to not allow the upper arm to lift off of body. Cues given with B ER to to keep upper arms still. Pt did fairly well with TB rows and ext.     Plan:   Cont to progress with shoulder strength              "

## 2025-04-24 ENCOUNTER — TREATMENT (OUTPATIENT)
Dept: PHYSICAL THERAPY | Facility: CLINIC | Age: 85
End: 2025-04-24
Payer: MEDICARE

## 2025-04-24 DIAGNOSIS — M25.511 RIGHT SHOULDER PAIN, UNSPECIFIED CHRONICITY: Primary | ICD-10-CM

## 2025-04-24 PROCEDURE — 97110 THERAPEUTIC EXERCISES: CPT | Mod: GP,CQ

## 2025-04-29 ENCOUNTER — TREATMENT (OUTPATIENT)
Dept: PHYSICAL THERAPY | Facility: CLINIC | Age: 85
End: 2025-04-29
Payer: MEDICARE

## 2025-04-29 DIAGNOSIS — M25.511 ACUTE PAIN OF RIGHT SHOULDER: Primary | ICD-10-CM

## 2025-04-29 PROCEDURE — 97110 THERAPEUTIC EXERCISES: CPT | Mod: GP | Performed by: PHYSICAL THERAPIST

## 2025-04-29 NOTE — PROGRESS NOTES
"Physical Therapy Treatment    Patient Name: Fabian Romero  MRN: [id number]  Today's Date: 4/29/2025  Time Calculation  Start Time: 1035  Stop Time: 1115  Time Calculation (min): 40 min       PT Therapeutic Procedures Time Entry  Therapeutic Exercise Time Entry: 38                   INSURANCE:  Visit number: 19/20  MEDICARE/ AARP 2410 ($0 USED) COPAY 0 (0)  COVERAGE 80/100 OOP 0 [employer]ARP TO FOLLOW MEDICARE  NO AUTH REQ [id number]/ALL      Precautions  R Reverse TSA Protocol, Sx date 11/8/24  Hx: A-fib, HTN, OA, Ulcers, PTSD    CURRENT PROBLEM:  1. Acute pain of right shoulder          SUBJECTIVE:   General -   Pt is doing home exercises.  Pt reports that he still had difficulty lifting his arm.    Pain -    No pain    Precautions -    R Reverse TSA Protocol, Sx date 11/8/24  Hx: A-fib, HTN, OA, Ulcers, PTSD    OBJECTIVE:     Treatment -   Therapeutic Exercise ([id number]) -  UBE F/R: 2 min ea   Supine Active Chest Press R: x9, x15  Supine Scapular retraction isometric: 5 sec x2 min  Supine Active Flexion R: x15 assisted  Supine Alphabet R: x1  Supine Rhythmic Stabilization: 30 sec x2  Side-lying ER with towel: 1# 2x10  Side-lying AB (assisted scapular depression): 2x10 ea  Side-lying flexion (assisted scapular depression): --  Supine T-band B ER: RTB 2x10  Supine T-band B HzAB: RTB 2x10  T-band Rows/Ext: GTB --  Seated Shoulder ER with elbow on table (60 deg): 1# x15 ---  T-band flexion/AB: YTB x10 ea ---  T-band ADD: BTB 2x10---  Wall Push Up: x10 (assisted) --  Seated wand chest press: 5x3---  Seated ER/IR isometric against therapist 5\" --    ASSESSMENT:     Continued with AROM and stabilization exercises this visit. Pt was introduced to UBE for improved strength, which he reported more difficulty with retro direction. Pt is improving with rhythmic stabilization, but demonstrated the most difficulty with flexion direction. Possibly try NMES next visit due to sulcus sign noted. Pt tolerated session well and is progressing " towards functional needs. Continue to progress pt within tolerance and POC.    PLAN:    Continue to progress pt within tolerance. Possibly try NMES next visit depending on precautions.

## 2025-05-02 ENCOUNTER — TREATMENT (OUTPATIENT)
Dept: PHYSICAL THERAPY | Facility: CLINIC | Age: 85
End: 2025-05-02
Payer: MEDICARE

## 2025-05-02 DIAGNOSIS — M25.511 ACUTE PAIN OF RIGHT SHOULDER: Primary | ICD-10-CM

## 2025-05-02 PROCEDURE — 97110 THERAPEUTIC EXERCISES: CPT | Mod: GP | Performed by: PHYSICAL THERAPIST

## 2025-05-02 NOTE — PROGRESS NOTES
Physical Therapy Treatment    Patient Name: Fbaian Romero  MRN: [id number]  Today's Date: 5/2/2025  Time Calculation  Start Time: 1042  Stop Time: 1125  Time Calculation (min): 43 min       PT Therapeutic Procedures Time Entry  Manual Therapy Time Entry: 5  Therapeutic Exercise Time Entry: 38                   INSURANCE:  Visit number: 20/26  MEDICARE/ AARP 2410 ($0 USED) COPAY 0 (0)  COVERAGE 80/100 OOP 0 [employer]ARP TO FOLLOW MEDICARE  NO AUTH REQ [id number]/ALL      Precautions  R Reverse TSA Protocol, Sx date 11/8/24  Hx: A-fib, HTN, OA, Ulcers, PTSD    CURRENT PROBLEM:  1. Acute pain of right shoulder          SUBJECTIVE:   General -   Pt is doing home exercises.  Sore after last visit  Getting better with seat belt     Pain -    Pain Location/Description: R shoulder  Pain Today: 1-2/10 discomfort    Precautions -    R Reverse TSA Protocol, Sx date 11/8/24  Hx: A-fib, HTN, OA, Ulcers, PTSD    OBJECTIVE:     Shoulder AROM -  R shoulder Flexion: 134 (supine), 30 (standing)  R shoulder Abduction: 60 standing  R shoulder ER: 10 standing, 50 (elbow supported at 90 abduction)     Shoulder PROM -  R shoulder Flexion: 153 with firm end feel  R shoulder Abduction: 114 with empty end feel   R shoulder ER: 78 with firm end feel     Outcome Measures -   Other Measures  Disability of Arm Shoulder Hand (DASH): 27 (% Quick)    Treatment -   Therapeutic Exercise ([id number]) -  Assessment  UBE F/R: 3 min ea  Supine Active Chest Press R: x10, x15  Supine Scapular retraction isometric: 5 sec x2 min   Supine Active Flexion R: 2x10  Supine Rhythmic Stabilization: 30 sec   Side-lying ER with towel: 1# 2x10  Supine Alphabet R: --  Side-lying AB (assisted scapular depression): --  Side-lying flexion (assisted scapular depression): --  Supine T-band B ER: RTB --  Supine T-band B HzAB: RTB --  T-band Rows/Ext: GTB --  Seated Shoulder ER with elbow on table (60 deg): 1# x15 ---  T-band flexion/AB: YTB x10 ea ---  T-band ADD: BTB 2x10---  Wall  "Push Up: x10 (assisted) --  Seated wand chest press: 5x3---  Seated ER/IR isometric against therapist 5\" --    ASSESSMENT:     Pt is progressing towards his goals and demonstrates improvements with both ROM and strength of the R shoulder at this time. Pt reports an increased ability to perform ADLs, but still exhibits deficits as shown by his Quick DASH score. Pt still lacks strength required for reaching, lifting, pushing, and pulling.Pt will benefit from continued skilled PT in order to further improve ROM and strength of the R shoulder so that the pt can perform ADLs without pain and return to PLOF.    PLAN:  Treatments/Interventions: traction, dry needling, edema control, education/instruction, home program, self care/home management, manual therapy, neuromuscular re-education, therapeutic activities, therapeutic exercises, modalities, therapeutic elastic taping.   PT Plan: Skilled PT  PT Frequency: 1 time per week  Duration: 6 visits  Certification Period Start Date: 05/02/25  Certification Period End Date: 07/31/25  Rehab Potential: Good  Plan of Care Agreement: Patient    Goals -    By discharge pt will achieve the following goals:   Pt will report less than a 2/10 pain at the worst. (Goal PROGRESSING)    Pt will report a significant improvement with Quick DASH score. (Goal PROGRESSING)    Pt will have at least 4+/5 strength for the right shoulder. (Goal PROGRESSING)    Pt will have AROM to WFL for the right shoulder. (Goal PROGRESSING)    Pt will be independent with HEP. (Goal PROGRESSING)      "

## 2025-05-05 ENCOUNTER — TREATMENT (OUTPATIENT)
Dept: PHYSICAL THERAPY | Facility: CLINIC | Age: 85
End: 2025-05-05
Payer: MEDICARE

## 2025-05-05 DIAGNOSIS — M25.511 ACUTE PAIN OF RIGHT SHOULDER: Primary | ICD-10-CM

## 2025-05-05 PROCEDURE — 97110 THERAPEUTIC EXERCISES: CPT | Mod: GP,CQ

## 2025-05-05 NOTE — PROGRESS NOTES
"Physical Therapy Treatment    Patient Name: Fabian Romero  MRN: [id number]  Today's Date: 5/5/2025  Time Calculation  Start Time: 1221  Stop Time: 1301  Time Calculation (min): 40 min     PT Therapeutic Procedures Time Entry  Therapeutic Exercise Time Entry: 38                 Current Problem  1. Acute pain of right shoulder            Insurance:  Visit number: 21/26  MEDICARE/ [employer]ARP 2410 ($0 USED) COPAY 0 (0)  COVERAGE 80/100 OOP 0 [employer]ARP TO FOLLOW MEDICARE  NO AUTH REQ [id number]/ALL   Precautions   R Reverse TSA Protocol, Sx date 11/8/24  Hx: A-fib, HTN, OA, Ulcers, PTSD    Subjective   Subjective:   Pt reports that yesterday was rough, due to his arthrits.   Pain   0/10    Objective   Treatments:  Assessment  UBE F/R: 2 min ea  Supine Active Chest Press R: x10, x10  Supine Scapular retraction isometric: 5 sec x2 min   Supine Active Flexion R: 2x10  Supine Rhythmic Stabilization: 30 sec x2  Side-lying ER with towel: 1# 2x10  Supine Alphabet R: --  Side-lying AB (assisted scapular depression):  Side-lying flexion (assisted scapular depression): 10x2  Supine T-band B HzAB: RTB  Supine B ER isometric 3-5\"x10  Supine T-band B ER: RTB --  T-band Rows/Ext: GTB --  Seated Shoulder ER with elbow on table (60 deg): 1# x15 ---  T-band flexion/AB: YTB x10 ea ---  T-band ADD: BTB 2x10---  Wall Push Up: x10 (assisted) --  Seated wand chest press: 5x3---  Seated ER/IR isometric against therapist 5\" --     OP EDUCATION:   Keep reaching for things with R UE, while being mindful of compensations.      Assessment:   Pt has to use momentum with s/l exercises. Tactile cues provided to utilize more muscular control. Pt had better form with supine horz abd. Pt challenged to keep the arm still with rhythmic stab. Had difficulty initiating lift off from table for active shoulder supine flx Did well with UBE.    Plan:   Increase strength in supine to improve form with sitting/standing.              "

## 2025-05-12 ENCOUNTER — TREATMENT (OUTPATIENT)
Dept: PHYSICAL THERAPY | Facility: CLINIC | Age: 85
End: 2025-05-12
Payer: MEDICARE

## 2025-05-12 DIAGNOSIS — M25.511 ACUTE PAIN OF RIGHT SHOULDER: Primary | ICD-10-CM

## 2025-05-12 PROCEDURE — 97110 THERAPEUTIC EXERCISES: CPT | Mod: GP,CQ

## 2025-05-12 ASSESSMENT — PAIN SCALES - GENERAL: PAINLEVEL_OUTOF10: 0 - NO PAIN

## 2025-05-12 ASSESSMENT — PAIN - FUNCTIONAL ASSESSMENT: PAIN_FUNCTIONAL_ASSESSMENT: 0-10

## 2025-05-12 NOTE — PROGRESS NOTES
"Physical Therapy Treatment    Patient Name: Fabian Romero  MRN: [id number]  Today's Date: 5/12/2025  Time Calculation  Start Time: 1217  Stop Time: 1257  Time Calculation (min): 40 min     PT Therapeutic Procedures Time Entry  Therapeutic Exercise Time Entry: 40                 Insurance:   Visit number: 22/26  MEDICARE/ AARP 2410 ($0 USED) COPAY 0 (0)  COVERAGE 80/100 OOP 0 [employer]ARP TO FOLLOW MEDICARE  NO AUTH REQ [id number]/ALL   Assessment:   Increased time on UBE today w/ appropriate muscle fatigue and challenge observed. Scap depression assistance needed w/ SL ex w/ improved follow through on technique. He does cont to compensate w/ ex's w/ significant compensation/elevation noted w/ attempt at SL Abd - pt unable to complete ex. Pt did not report pain w/ any of the given ex's. He will cont to benefit form skilled PT to address his deficits and improve his overall functional ability.     Plan:    Increase strength in supine to improve form with sitting/standing.     Current Problem  1. Acute pain of right shoulder            Subjective   General   No current pain reported. Pt states he cont's to try to use arm at home but does have some soreness.   Precautions    R Reverse TSA Protocol, Sx date 11/8/24  Hx: A-fib, HTN, OA, Ulcers, PTSD    Pain  Pain Assessment: 0-10  0-10 (Numeric) Pain Score: 0 - No pain    Objective       Treatments:  Therapeutic Exercise ([id number]):   UBE F/R: 3 min ea  Supine Active Chest Press R: x10, x10  Supine Scapular retraction isometric: 5 sec x2 min   Supine Active Flexion R: 2x10  Supine Rhythmic Stabilization: 30 sec x2  Side-lying ER with towel: 1# 2x10  Supine Alphabet R: --  Side-lying AB (assisted scapular depression): --  Side-lying flexion (assisted scapular depression): 10x2  Supine T-band B HzAB: RTB x10  Supine B ER isometric 3-5\"x10  Supine T-band B ER: RTB --  T-band Rows/Ext: GTB --  Seated Shoulder ER with elbow on table (60 deg): 1# x15 ---  T-band flexion/AB: YTB x10 ea " "  T-band ADD: BTB 2x10   Wall Push Up: x10 (assisted) --  Seated wand chest press: 5x3---  Seated ER/IR isometric against therapist 5\" --    EDUCATION:    Keep reaching for things with R UE, while being mindful of compensations.       "

## 2025-05-19 ENCOUNTER — TREATMENT (OUTPATIENT)
Dept: PHYSICAL THERAPY | Facility: CLINIC | Age: 85
End: 2025-05-19
Payer: MEDICARE

## 2025-05-19 DIAGNOSIS — M25.511 ACUTE PAIN OF RIGHT SHOULDER: Primary | ICD-10-CM

## 2025-05-19 PROCEDURE — 97110 THERAPEUTIC EXERCISES: CPT | Mod: GP,CQ

## 2025-05-19 ASSESSMENT — PAIN SCALES - GENERAL: PAINLEVEL_OUTOF10: 0 - NO PAIN

## 2025-05-19 ASSESSMENT — PAIN - FUNCTIONAL ASSESSMENT: PAIN_FUNCTIONAL_ASSESSMENT: 0-10

## 2025-05-19 NOTE — PROGRESS NOTES
"Physical Therapy Treatment    Patient Name: Fabian Romero  MRN: [id number]  Today's Date: 5/19/2025  Time Calculation  Start Time: 1130  Stop Time: 1210  Time Calculation (min): 40 min     PT Therapeutic Procedures Time Entry  Therapeutic Exercise Time Entry: 40                 Insurance:   Visit number: 23/26  MEDICARE/ [employer]ARP 2410 ($0 USED) COPAY 0 (0)  COVERAGE 80/100 OOP 0 AARP TO FOLLOW MEDICARE  NO AUTH REQ [id number]   Assessment:   Cont'd focus on strengthening and ROM gravity eliminated and w/ mod scapular support to decrease compensation. Pt presented w/ improved ability to complete SL Flex w/ less cues from therapist. Increased resistance to Blue for TB Rows and Ext w/ overall good ability. Pt most challenged w/ TB Abd and Flex and had to take band away to complete flex ex. Pt tends to swing arm to complete ex - much encouragement to hold end range motion and perform slowly. He will focus on this more at home as well. Pt making slow progress at this time and will cont to benefit from skilled PT to address his deficits and improve his overall functional ability.     Plan:     Increase strength in supine to improve form with sitting/standing.     Current Problem  1. Acute pain of right shoulder            Subjective   General   Pt states he's been doing a lot of yard work/gardening lately and the shoulder was \"sore\" but otherwise ok.   Precautions   rTSA    Pain  Pain Assessment: 0-10  0-10 (Numeric) Pain Score: 0 - No pain    Objective       Treatments:  Therapeutic Exercise (28597):   UBE F/R: 3 min ea  Supine Active Chest Press R: x10, x10  Supine Scapular retraction isometric: 5 sec x2 min   Supine Active Flexion R: 2x10  Supine Rhythmic Stabilization: 30 sec x2 --  Side-lying ER with towel: 1# 2x10  Supine Alphabet R: --  Side-lying AB (assisted scapular depression): --  Side-lying flexion (assisted scapular depression): 10x2  Supine T-band B HzAB: RTB x10  Supine B ER isometric 3-5\"x10  Supine " "T-band B ER: RTB --  T-band Rows/Ext: BlueTB   Seated Shoulder ER with elbow on table (60 deg): 1# x15 ---  T-band flexion/AB: YTB x10 ea   T-band ADD: BTB 2x10   Wall Push Up: x10 (assisted) --  Seated wand chest press: 5x3---  Seated ER/IR isometric against therapist 5\" --      EDUCATION:     Keep reaching for things with R UE, while being mindful of compensations. Encouraged slow pacing and not \"swinging\" arm to do ex's.       "

## 2025-05-27 ENCOUNTER — TREATMENT (OUTPATIENT)
Dept: PHYSICAL THERAPY | Facility: CLINIC | Age: 85
End: 2025-05-27
Payer: MEDICARE

## 2025-05-27 DIAGNOSIS — M25.511 ACUTE PAIN OF RIGHT SHOULDER: Primary | ICD-10-CM

## 2025-05-27 PROCEDURE — 97110 THERAPEUTIC EXERCISES: CPT | Mod: GP,CQ

## 2025-05-27 ASSESSMENT — PAIN SCALES - GENERAL: PAINLEVEL_OUTOF10: 1

## 2025-05-27 ASSESSMENT — PAIN - FUNCTIONAL ASSESSMENT: PAIN_FUNCTIONAL_ASSESSMENT: 0-10

## 2025-05-27 ASSESSMENT — PAIN DESCRIPTION - DESCRIPTORS: DESCRIPTORS: SORE

## 2025-05-27 NOTE — PROGRESS NOTES
"Physical Therapy Treatment    Patient Name: Fabian Romero  MRN: [id number]  Today's Date: 5/27/2025  Time Calculation  Start Time: 1145  Stop Time: 1225  Time Calculation (min): 40 min     PT Therapeutic Procedures Time Entry  Therapeutic Exercise Time Entry: 40                 Insurance:   Visit number: 24/26  MEDICARE/ [employer]ARP 2410 ($0 USED) COPAY 0 (0)  COVERAGE 80/100 OOP 0 AARP TO FOLLOW MEDICARE  NO AUTH REQ [id number]   Assessment:   Pt cont's to demo mod compensation w/ attempt to elevate RUE. Mod cues also throughout session for postural awareness. With tactile cues and manual depression w/ SL ex's he presents w/ improved technique however does fatigue rather quickly. Pt's strength slowly improving w/ current program. No increased pain c/o w/ today's ex's.  We will cont to address strength and functional deficits.     Plan:   Increase strength in supine to improve form with sitting/standing.   Re-check sched 6/9.     Current Problem  1. Acute pain of right shoulder            Subjective   General   Pt states the shoulder is \"sore\" today, likely from doing a lot over the weekend (around the yard).   Precautions       Pain  Pain Assessment: 0-10  0-10 (Numeric) Pain Score: 1  Pain Location: Shoulder  Pain Orientation: Right  Pain Descriptors: Sore    Objective       Treatments:  Therapeutic Exercise ([id number]):    UBE F/R: 3 min ea  Supine Active Chest Press R: x10, x10  Supine Scapular retraction isometric: 5 sec x1 min   Supine Active Flexion R: 2x10  Supine Rhythmic Stabilization: 30 sec x2 --  Side-lying ER with towel: 1# 2x10  Supine Alphabet R:    Side-lying AB (assisted scapular depression): --  Side-lying flexion (assisted scapular depression): 10x2  Supine T-band B HzAB: RTB x10  Supine B ER isometric 3-5\"x10  Supine T-band B ER: RTB --  T-band Rows/Ext: BlueTB 2x10  Seated Shoulder ER with elbow on table (60 deg): 1# x15 ---  T-band flexion/AB: YTB x10 ea (flex)  T-band ADD: BTB 2x10   Wall Push " "Up: x10 (assisted) --  Seated wand chest press: 5x3---  Seated ER/IR isometric against therapist 5\" --    EDUCATION:     Keep reaching for things with R UE, while being mindful of compensations. Encouraged slow pacing and not \"swinging\" arm to do ex's.       "

## 2025-06-02 ENCOUNTER — TREATMENT (OUTPATIENT)
Dept: PHYSICAL THERAPY | Facility: CLINIC | Age: 85
End: 2025-06-02
Payer: MEDICARE

## 2025-06-02 DIAGNOSIS — M25.511 ACUTE PAIN OF RIGHT SHOULDER: Primary | ICD-10-CM

## 2025-06-02 PROCEDURE — 97110 THERAPEUTIC EXERCISES: CPT | Mod: GP,CQ

## 2025-06-02 ASSESSMENT — PAIN - FUNCTIONAL ASSESSMENT: PAIN_FUNCTIONAL_ASSESSMENT: 0-10

## 2025-06-02 NOTE — PROGRESS NOTES
"Physical Therapy Treatment    Patient Name: Fabian Romero  MRN: [id number]  Today's Date: 6/2/2025  Time Calculation  Start Time: 1133  Stop Time: 1215  Time Calculation (min): 42 min     PT Therapeutic Procedures Time Entry  Therapeutic Exercise Time Entry: 42                 Insurance:   Visit number: 25/26  MEDICARE/ AARP 2410 ($0 USED) COPAY 0 (0)  COVERAGE 80/100 OOP 0 AARP TO FOLLOW MEDICARE  NO AUTH REQ [id number]/ALL   Assessment:   Progressed pt's program w/ addition of Supine PNF Diagonal (D2 flex) and resumed seated ER w/ 1# wt. Pt has more ease w/ some scapular strengthening ex's however does still present w/ significant weakness and compensation. He has had to adjust his activities at home to accommodate R shoulder as he has difficulty reaching and keeping arm up for any amt of time. Pt demo's good understanding of given ex's however does need cues still for postural awareness.     Plan:    Increase strength in supine to improve form with sitting/standing.   Re-check sched 6/9.     Current Problem  1. Acute pain of right shoulder            Subjective   General   Pt has no new c/o today. Pt states he had to fix something in his work shop over the weekend and had a hard time keeping his R arm up to remove a screw. Pt states he finally had to use a tote to hold his arm up   Precautions   rTSA    Pain  Pain Assessment: 0-10  Pain Location: Shoulder  Pain Orientation: Right    Objective       Treatments:  Therapeutic Exercise ([id number]):    UBE F/R: 3 min ea  Supine Active Chest Press R: x10, x10  Supine Scapular retraction isometric: 5 sec x1 min   Supine Active Flexion R: 2x10  Supine Rhythmic Stabilization: 30 sec x2 --  Side-lying ER with towel: 1# 2x10  Supine Alphabet R:  1x  Side-lying AB (assisted scapular depression): --  Side-lying flexion (assisted scapular depression): 10x2  Supine D2 Flex R: x8   Supine T-band B HzAB: RTB x10  Supine B ER isometric 3-5\"x10  Supine T-band B ER: RTB --  T-band " "Rows/Ext: BlueTB 2x10  Seated Shoulder ER with elbow on table (60 deg): 1# 2x10  T-band flexion/AB: YTB x10 ea (flex)--  T-band ADD: BTB 2x10 --  Wall Push Up: x10 (assisted) --  Seated wand chest press: 5x3---  Seated ER/IR isometric against therapist 5\" --      EDUCATION:         "

## 2025-06-09 ENCOUNTER — TREATMENT (OUTPATIENT)
Dept: PHYSICAL THERAPY | Facility: CLINIC | Age: 85
End: 2025-06-09
Payer: MEDICARE

## 2025-06-09 DIAGNOSIS — M25.511 ACUTE PAIN OF RIGHT SHOULDER: Primary | ICD-10-CM

## 2025-06-09 PROCEDURE — 97110 THERAPEUTIC EXERCISES: CPT | Mod: GP | Performed by: PHYSICAL THERAPIST

## 2025-06-09 NOTE — PROGRESS NOTES
Physical Therapy Treatment    Patient Name: Fabian Romero  MRN: [id number]  Today's Date: 6/9/2025  Time Calculation  Start Time: 0210  Stop Time: 0250  Time Calculation (min): 40 min       PT Therapeutic Procedures Time Entry  Therapeutic Exercise Time Entry: 38                   INSURANCE:  Visit number: 26/26  MEDICARE/ AARP 2410 ($0 USED) COPAY 0 (0)  COVERAGE 80/100 OOP 0 AARP TO FOLLOW MEDICARE  NO AUTH REQ [id number]/ALL     CURRENT PROBLEM:  1. Acute pain of right shoulder          SUBJECTIVE:   General -   Pt is doing home exercises.  Did a lot yesterday  Has bad days and good days  R UE falling asleep at night    Pain -    Pain Location/Description: R shoulder  Pain Today: 2/10     Precautions -    R Reverse TSA Protocol, Sx date 11/8/24  Hx: A-fib, HTN, OA, Ulcers, PTSD    OBJECTIVE:     Shoulder AROM -  R shoulder Flexion: 140 (supine), 30 (standing)  R shoulder Abduction: 60 standing  R shoulder ER: 20 standing     Shoulder PROM -  R shoulder Flexion: 153 with firm end feel  R shoulder Abduction: 118 with empty end feel   R shoulder ER: 80 with firm end feel    Outcome Measures -   Other Measures  Disability of Arm Shoulder Hand (DASH): 39 (% Quick)    Treatment -   Therapeutic Exercise ([id number]) -  Assessment  UBE F/R: 3 min ea   Supine Active Flexion R: 2x10     OP Patient Education -   Access Code: [email or web address]  URL: [email or web address]  Date: 06/09/2025  Prepared by: Manjula Lugo    Exercises  - Seated Correct Posture   - Seated Scapular Retraction  - 1-2 x daily - 2 sets - 10 reps - 2 seconds hold  - Seated Upper Trapezius Stretch  - 1-2 x daily - 3 sets - 30 seconds hold  - Gentle Levator Scapulae Stretch  - 1-2 x daily - 3 sets - 30 seconds hold  - Seated Bilateral Shoulder Flexion Towel Slide at Table Top  - 1-2 x daily - 2-3 sets - 10 reps  - Seated Shoulder Towel Slides Scaption at Table  - 1-2 x daily - 2-3 sets - 10 reps  - Supine Shoulder Press with Dowel  - 1-2 x daily  - 2-3 sets - 10 reps  - Supine Shoulder Flexion AAROM with Dowel  - 1-2 x daily - 2-3 sets - 10 reps  - Supine Shoulder External Rotation with Dowel  - 1-2 x daily - 2-3 sets - 10 reps  - Supine Isometric Shoulder Extension with Towel  - 1-2 x daily - 2 sets - 10 reps - 5 sec hold  - Supine Shoulder Flexion with Free Weight  - 1-2 x daily - 2-3 sets - 10 reps  - Supine Shoulder External Rotation with Resistance  - 1-2 x daily - 2-3 sets - 10 reps  - Supine Shoulder Horizontal Abduction with Resistance  - 1-2 x daily - 2-3 sets - 10 reps  - Sidelying Shoulder External Rotation with Dumbbell  - 1-2 x daily - 2-3 sets - 10 reps  - Sidelying Shoulder Abduction Palm Forward  - 1-2 x daily - 2-3 sets - 10 reps  - Sidelying Shoulder Flexion 15 Degrees  - 1-2 x daily - 2-3 sets - 10 reps  - Supine Shoulder Alphabet  - 1-2 x daily - 1-2 sets  - Supine PNF D2  - 1-2 x daily - 2-3 sets - 10 reps  - Standing Row with Anchored Resistance  - 1-2 x daily - 2-3 sets - 10 reps  - Shoulder extension with resistance - Neutral  - 1-2 x daily - 2-3 sets - 10 reps    ASSESSMENT:     Pt has plateauted towards his goals and demonstrates only minimal improvements with both AROM and strength of the R shoulder at this time. Pt was given and reviewed an updated HEP. Pt will therefore be discharged from skilled PT at this time and can call with any further questions/concerns. Pt was educated to follow up with doctor if he continues to have pain or if the shoulder does not continue to improve.    PLAN:  PT Plan: No Additional PT interventions required at this time  Rehab Potential: Fair  Plan of Care Agreement: Patient  Discharge pt from current episode of care.     Goals -    By discharge pt will achieve the following goals:   Pt will report less than a 2/10 pain at the worst. (Goal PROGRESSING)    Pt will report a significant improvement with Quick DASH score. (Goal PROGRESSING)    Pt will have at least 4+/5 strength for the right shoulder.  (Goal PROGRESSING)    Pt will have AROM to WFL for the right shoulder. (Goal PROGRESSING)    Pt will be independent with HEP. (Goal MET)

## (undated) DEVICE — STRAP, VELCRO, BODY, 4 X 60IN, NS

## (undated) DEVICE — KIT, SCHLEIN, BEACH CHAIR, LF

## (undated) DEVICE — Device

## (undated) DEVICE — PACK ARTHRO III ST SIRUS

## (undated) DEVICE — BANDAGE COMPR SINGLE LAYERED 9 FTX6 IN EXSANG WHT ESMARCH

## (undated) DEVICE — DRESSING, ABDOMINAL PAD, CURITY, 7.5 X 8 IN

## (undated) DEVICE — DRAIN, JACKSON-PRATT, ROUND/W TROCAR, SILICONE, 10FR

## (undated) DEVICE — PADDING, CAST, SPECIALIST, 4 IN X 4 YD, STERILE

## (undated) DEVICE — BANDAGE, COFLEX, 4 X 5 YDS, FOAM TAN, STERILE, LF

## (undated) DEVICE — PREVENA PLUS 125 THERAPY UNITPREVENA PLUS 125 THERAPY UNIT WITH PREVENA PLUS 150ML CANISTER: Brand: PREVENA PLUS™

## (undated) DEVICE — PREP, IODOPHOR, W/ALCOHOL, DURAPREP, W/APPLICATOR, 26 CC

## (undated) DEVICE — GOWN,AURORA,NONREINFORCED,LARGE: Brand: MEDLINE

## (undated) DEVICE — 6MM CANNULATED DRILL 15MM

## (undated) DEVICE — DRAPE, INCISE, ANTIMICROBIAL, IOBAN 2, LARGE, 17 X 23 IN, DISPOSABLE, STERILE

## (undated) DEVICE — GLOVE ORANGE PI 7   MSG9070

## (undated) DEVICE — SPONGE GZ W4XL4IN COT 12 PLY TYP VII WVN C FLD DSGN

## (undated) DEVICE — DRESSING NEG PRSS 13CM PREVENA

## (undated) DEVICE — BATH BLANKET STERILE

## (undated) DEVICE — GOWN,SIRUS,POLYRNF,BRTHSLV,XLN/XL,20/CS: Brand: MEDLINE

## (undated) DEVICE — GLOVE, SURGICAL, PROTEXIS PI , 7.5, PF, LF

## (undated) DEVICE — SUTURE, MONOCRYL, 4-0, 27 IN, PS-2, UNDYED

## (undated) DEVICE — BLADE, SAW, SAGITTAL, DUAL CUT, 18 X 90 X 1.27

## (undated) DEVICE — ADHESIVE, SKIN, DERMABOND ADVANCED, 15CM, PEN-STYLE

## (undated) DEVICE — ZIMMER® STERILE DISPOSABLE TOURNIQUET CUFF, DUAL PORT, SINGLE BLADDER, 34 IN. (86 CM)

## (undated) DEVICE — DRESSING, MEPILEX BORDER, POST-OP AG, 4 X 12 IN

## (undated) DEVICE — DRESSING, DRAIN SPONGE, EXCILON AMD, 4X4 IN, 6 PLY, ST

## (undated) DEVICE — SPONGE,LAP,18"X18",DLX,XR,ST,5/PK,40/PK: Brand: MEDLINE

## (undated) DEVICE — SUTURE MCRYL + SZ 3-0 L36IN ABSRB UD CT-1 L36MM 1/2 CIR MCP944H

## (undated) DEVICE — PADDING CAST W6INXL4YD RAYON UNDERCAST SOF-ROL

## (undated) DEVICE — DRESSING GZ W1XL8IN COT XRFRM N ADH OVERWRAP CURAD

## (undated) DEVICE — SYRINGE IRRIG 60ML SFT PLIABLE BLB EZ TO GRP 1 HND USE W/

## (undated) DEVICE — INTENDED FOR TISSUE SEPARATION, AND OTHER PROCEDURES THAT REQUIRE A SHARP SURGICAL BLADE TO PUNCTURE OR CUT.: Brand: BARD-PARKER ® CARBON RIB-BACK BLADES

## (undated) DEVICE — STRAP, ARM BOARD, 32 X 1.5

## (undated) DEVICE — DRAPE, INSTRUMENT, W/POUCH, STERI DRAPE, 7 X 11 IN, DISPOSABLE, STERILE

## (undated) DEVICE — COUNTER NDL 40 COUNT HLD 70 FOAM BLK ADH W/ MAG

## (undated) DEVICE — SOLUTION, INJECTION, USP, SODIUM CHLORIDE 0.9%, .9, NACL, 1000 ML, BAG

## (undated) DEVICE — BLADE, GEN COATED 2.75, LF

## (undated) DEVICE — ELECTRODE PT RET AD L9FT HI MOIST COND ADH HYDRGEL CORDED

## (undated) DEVICE — PAD,ABDOMINAL,8"X10",ST,LF: Brand: MEDLINE

## (undated) DEVICE — 3M™ STERI-DRAPE™ U-DRAPE 1015: Brand: STERI-DRAPE™

## (undated) DEVICE — STOCKINETTE, IMPERVIOUS, LARGE, 9IN X 48IN

## (undated) DEVICE — SUTURE, VICRYL 0, TAPER POINT, CT-1 VIOLET 27 INCH

## (undated) DEVICE — SOLUTION, IRRIGATION, STERILE WATER, 1000 ML, POUR BOTTLE

## (undated) DEVICE — GLOVE ORANGE PI 7 1/2   MSG9075

## (undated) DEVICE — PEN: MARKING STD 100/CS: Brand: MEDICAL ACTION INDUSTRIES

## (undated) DEVICE — WOUND SYSTEM, DEBRIDEMENT & CLEANING, O.R DUOPAK

## (undated) DEVICE — ELECTROSURGICAL PENCIL BUTTON SWITCH E-Z CLEAN COATED BLADE ELECTRODE 10 FT (3 M) CORD HOLSTER: Brand: MEGADYNE

## (undated) DEVICE — CAUTERY, PENCIL, PUSH BUTTON, SMOKE EVAC, 70MM

## (undated) DEVICE — BANDAGE COMPR W6INXL5YD HI E BGE W/ CLP SURE-WRAP

## (undated) DEVICE — MAT, FLOOR, STANDARD FLUID BARRIER, 32X44, GREEN

## (undated) DEVICE — TUBING, SUCTION, 1/4" X 10', STRAIGHT: Brand: MEDLINE

## (undated) DEVICE — MANIFOLD, 4 PORT NEPTUNE STANDARD

## (undated) DEVICE — RESERVOIR, DRAINAGE, WOUND, JACKSON-PRATT, 100 CC, SILICONE

## (undated) DEVICE — TIP, SUCTION, YANKAUER, FLEXIBLE

## (undated) DEVICE — SUTURE ETHLN SZ 2-0 L30IN NONABSORBABLE BLK L36MM PSLX 3/8 1697H

## (undated) DEVICE — CHLORAPREP 26ML ORANGE

## (undated) DEVICE — SPLINT KNEE UNIV FOR LESS THAN 36IN L20IN FOAM LAM E CNTCT

## (undated) DEVICE — SUTURE, FIBERWIRE 2, T-5 TAPER NEEDLE, 38"

## (undated) DEVICE — BANDAGE COMPR W4INXL5YD BGE HI E W/ REM CLP SURE-WRAP

## (undated) DEVICE — YANKAUER,SMOOTH HANDLE,HIGH CAPACITY: Brand: MEDLINE INDUSTRIES, INC.

## (undated) DEVICE — STAPLER SKIN H3.9MM WIRE DIA0.58MM CRWN 6.9MM 35 STPL FIX

## (undated) DEVICE — GLOVE, SURGICAL, PROTEXIS PI BLUE W/NEUTHERA, 8.0, PF, LF